# Patient Record
Sex: FEMALE | Race: WHITE | NOT HISPANIC OR LATINO | Employment: FULL TIME | URBAN - METROPOLITAN AREA
[De-identification: names, ages, dates, MRNs, and addresses within clinical notes are randomized per-mention and may not be internally consistent; named-entity substitution may affect disease eponyms.]

---

## 2017-04-05 ENCOUNTER — HOSPITAL ENCOUNTER (OUTPATIENT)
Dept: SLEEP CENTER | Facility: CLINIC | Age: 62
Discharge: HOME | End: 2017-04-05
Payer: COMMERCIAL

## 2017-04-05 ENCOUNTER — TRANSCRIBE ORDERS (OUTPATIENT)
Dept: SLEEP CENTER | Facility: CLINIC | Age: 62
End: 2017-04-05

## 2017-04-05 DIAGNOSIS — G47.33 OSA (OBSTRUCTIVE SLEEP APNEA): Primary | ICD-10-CM

## 2017-04-05 DIAGNOSIS — G47.33 OSA (OBSTRUCTIVE SLEEP APNEA): ICD-10-CM

## 2018-04-06 LAB — HCV AB SER-ACNC: NORMAL

## 2020-01-23 LAB
LEFT EYE DIABETIC RETINOPATHY: NORMAL
RIGHT EYE DIABETIC RETINOPATHY: NORMAL

## 2020-08-05 RX ORDER — FLUTICASONE PROPIONATE 50 MCG
2 SPRAY, SUSPENSION (ML) NASAL DAILY
COMMUNITY
Start: 2012-02-06 | End: 2021-03-29 | Stop reason: SDUPTHER

## 2020-08-05 RX ORDER — CITALOPRAM 20 MG/1
1 TABLET ORAL DAILY
COMMUNITY
Start: 2012-11-30 | End: 2021-03-29 | Stop reason: SDUPTHER

## 2020-08-05 RX ORDER — LISINOPRIL 20 MG/1
1 TABLET ORAL DAILY
COMMUNITY
Start: 2011-12-20 | End: 2020-08-10 | Stop reason: SDUPTHER

## 2020-08-05 RX ORDER — ALPRAZOLAM 0.5 MG/1
TABLET ORAL
COMMUNITY
Start: 2012-11-30 | End: 2022-04-29

## 2020-08-05 RX ORDER — ALBUTEROL SULFATE 90 UG/1
2 AEROSOL, METERED RESPIRATORY (INHALATION) EVERY 4 HOURS PRN
COMMUNITY
Start: 2011-11-17

## 2020-08-05 RX ORDER — DULAGLUTIDE 1.5 MG/.5ML
1.5 INJECTION, SOLUTION SUBCUTANEOUS WEEKLY
COMMUNITY
Start: 2020-05-26 | End: 2021-10-04

## 2020-08-05 RX ORDER — CHOLECALCIFEROL (VITAMIN D3) 125 MCG
1 CAPSULE ORAL DAILY
COMMUNITY
Start: 2012-11-30

## 2020-08-10 ENCOUNTER — OFFICE VISIT (OUTPATIENT)
Dept: INTERNAL MEDICINE CLINIC | Facility: CLINIC | Age: 65
End: 2020-08-10
Payer: COMMERCIAL

## 2020-08-10 VITALS
DIASTOLIC BLOOD PRESSURE: 74 MMHG | SYSTOLIC BLOOD PRESSURE: 126 MMHG | HEART RATE: 90 BPM | OXYGEN SATURATION: 97 % | HEIGHT: 67 IN | WEIGHT: 221 LBS | BODY MASS INDEX: 34.69 KG/M2 | TEMPERATURE: 97.6 F

## 2020-08-10 DIAGNOSIS — I73.00 RAYNAUD'S DISEASE WITHOUT GANGRENE: ICD-10-CM

## 2020-08-10 DIAGNOSIS — E08.65 DIABETES MELLITUS DUE TO UNDERLYING CONDITION, UNCONTROLLED, WITH HYPERGLYCEMIA (HCC): Primary | ICD-10-CM

## 2020-08-10 DIAGNOSIS — E66.9 OBESITY (BMI 30.0-34.9): ICD-10-CM

## 2020-08-10 DIAGNOSIS — I10 HYPERTENSION, ESSENTIAL, BENIGN: ICD-10-CM

## 2020-08-10 PROBLEM — E04.2 MULTIPLE THYROID NODULES: Status: ACTIVE | Noted: 2020-08-10

## 2020-08-10 PROBLEM — E66.811 OBESITY (BMI 30.0-34.9): Status: ACTIVE | Noted: 2020-08-10

## 2020-08-10 PROBLEM — G47.33 OSA (OBSTRUCTIVE SLEEP APNEA): Status: ACTIVE | Noted: 2020-08-10

## 2020-08-10 PROCEDURE — 3725F SCREEN DEPRESSION PERFORMED: CPT | Performed by: INTERNAL MEDICINE

## 2020-08-10 PROCEDURE — 3008F BODY MASS INDEX DOCD: CPT | Performed by: INTERNAL MEDICINE

## 2020-08-10 PROCEDURE — 1036F TOBACCO NON-USER: CPT | Performed by: INTERNAL MEDICINE

## 2020-08-10 PROCEDURE — 3078F DIAST BP <80 MM HG: CPT | Performed by: INTERNAL MEDICINE

## 2020-08-10 PROCEDURE — 99204 OFFICE O/P NEW MOD 45 MIN: CPT | Performed by: INTERNAL MEDICINE

## 2020-08-10 PROCEDURE — 3074F SYST BP LT 130 MM HG: CPT | Performed by: INTERNAL MEDICINE

## 2020-08-10 RX ORDER — LISINOPRIL 10 MG/1
1 TABLET ORAL DAILY
COMMUNITY
Start: 2020-08-01 | End: 2021-05-11 | Stop reason: SDUPTHER

## 2020-08-10 RX ORDER — BLOOD SUGAR DIAGNOSTIC
STRIP MISCELLANEOUS
COMMUNITY
Start: 2020-07-23

## 2020-08-10 NOTE — PROGRESS NOTES
Assessment/Plan:    Diabetes mellitus due to underlying condition, uncontrolled, with hyperglycemia ([address] )  Continue metformin and Trulicity  Endocrine following  On lisinopril  Up to date with DM eye exam    Lab Results   Component Value Date    HGBA1C 7 0 (H) 07/30/2020       Hypertension, essential, benign  Controlled on lisinopril    Raynaud's disease without gangrene  Discussed use of CCB for treatment  Will not start at this time    Obesity (BMI 30 0-34  9)  BMI Counseling: Body mass index is 34 61 kg/m²  The BMI is above normal  Nutrition recommendations include decreasing overall calorie intake  Problem List Items Addressed This Visit        Endocrine    Diabetes mellitus due to underlying condition, uncontrolled, with hyperglycemia ([address] ) - Primary     Continue metformin and Trulicity  Endocrine following  On lisinopril  Up to date with DM eye exam    Lab Results   Component Value Date    HGBA1C 7 0 (H) 07/30/2020            Relevant Medications    metFORMIN (GLUCOPHAGE) 500 mg tablet    Dulaglutide (Trulicity) 1 5 WN/9 3UN SOPN    Other Relevant Orders    CBC and differential       Cardiovascular and Mediastinum    Hypertension, essential, benign     Controlled on lisinopril         Relevant Medications    lisinopril (ZESTRIL) 10 mg tablet    Other Relevant Orders    CBC and differential    Raynaud's disease without gangrene     Discussed use of CCB for treatment  Will not start at this time            Other    Obesity (BMI 30 0-34  9)     BMI Counseling: Body mass index is 34 61 kg/m²  The BMI is above normal  Nutrition recommendations include decreasing overall calorie intake  Subjective:      Patient ID: Piedad Vergara is a 60 y o  female  HPI  Here to establish care    She was a patient of Dr Ritu Cotton  DM without complications on metformin and Trulicity followed by endocrinology  -140s  A1C at 7 from 8 1 since she lost weight with dietary changes  She lost 30lbs but has gained 10lbs back recently  She was seeing a doctor for a while for this but cannot afford cost of visit  She did not take any meds/supplements  Up to date with eye exam  She is on lisinopril  BP (+white coat HTN)    Cholesterol is normal  She is not on statin therapy  Sees a chiropractor for low back pain with left sciatica  Tightness in the left foot and ankle   Varicose veins and extensive spider veins in the feet  Redness on the calves for which she has seen dermatology and uses a topical steroid for 2 weeks at a time  + Raynauds in hands especially, +family history, no treatment  Up to date with screening tests    The following portions of the patient's history were reviewed and updated as appropriate: allergies, current medications, past family history, past medical history, past social history, past surgical history and problem list     Review of Systems   Constitutional: Positive for unexpected weight change (weight gain)  Negative for chills, fatigue and fever  HENT: Negative for congestion, sinus pressure and sore throat  Respiratory: Negative for cough, shortness of breath and wheezing  Cardiovascular: Positive for leg swelling  Negative for chest pain and palpitations  Gastrointestinal: Negative for abdominal pain, constipation, diarrhea, nausea and vomiting  Genitourinary: Negative for difficulty urinating  Musculoskeletal: Positive for arthralgias and back pain  Negative for myalgias  Skin: Positive for color change  Neurological: Negative for dizziness and headaches  Psychiatric/Behavioral: Negative for dysphoric mood  The patient is not nervous/anxious  Objective:      /74   Pulse 90   Temp 97 6 °F (36 4 °C)   Ht 5' 7" (1 702 m)   Wt 100 kg (221 lb)   SpO2 97%   BMI 34 61 kg/m²          Physical Exam  Constitutional:       Appearance: She is well-developed  HENT:      Head: Normocephalic and atraumatic        Right Ear: External ear normal       Left Ear: External ear normal    Eyes:      Conjunctiva/sclera: Conjunctivae normal    Neck:      Musculoskeletal: Neck supple  Cardiovascular:      Rate and Rhythm: Normal rate and regular rhythm  Pulses: no weak pulses          Dorsalis pedis pulses are 2+ on the right side and 2+ on the left side  Heart sounds: Normal heart sounds  No murmur  Comments: Extensive spider veins both feet  Pulmonary:      Effort: Pulmonary effort is normal  No respiratory distress  Breath sounds: Normal breath sounds  No wheezing or rales  Abdominal:      General: There is no distension  Palpations: Abdomen is soft  There is no mass  Tenderness: There is no abdominal tenderness  There is no guarding or rebound  Musculoskeletal:      Right lower leg: No edema  Left lower leg: No edema  Feet:      Right foot:      Skin integrity: Dry skin present  No ulcer, erythema or callus  Left foot:      Skin integrity: Dry skin present  No ulcer, erythema or callus  Skin:     General: Skin is warm and dry  Findings: Erythema (both calves) present  Neurological:      Mental Status: She is alert and oriented to person, place, and time  Psychiatric:         Behavior: Behavior normal          Thought Content: Thought content normal          Judgment: Judgment normal        Patient's shoes and socks removed  Right Foot/Ankle   Right Foot Inspection  Skin Exam: skin intact and dry skin no callus, no erythema, no pre-ulcer, no ulcer and no callus                          Toe Exam: ROM and strength within normal limits  Sensory       Monofilament testing: intact  Vascular    The right DP pulse is 2+  Left Foot/Ankle  Left Foot Inspection  Skin Exam: skin intact and dry skinno erythema, no pre-ulcer, no ulcer and no callus                         Toe Exam: ROM and strength within normal limits                   Sensory       Monofilament: intact  Vascular    The left DP pulse is 2+     Assign Risk Category:  No deformity present; No loss of protective sensation;  No weak pulses       Risk: 0

## 2020-08-11 ENCOUNTER — TELEPHONE (OUTPATIENT)
Dept: ADMINISTRATIVE | Facility: OTHER | Age: 65
End: 2020-08-11

## 2020-08-11 NOTE — LETTER
Procedure Request Form: Mammogram      Date Requested: 20  Patient: Margurite Shabnam  Patient : 1955   Referring Provider: Socrates Thornton, MD        Date of Procedure ______________________________       The above patient has informed us that they have completed their   most recent Mammogram at your facility  Please complete   this form and attach all corresponding procedure reports/results  Comments __________________________________________________________  ____________________________________________________________________  ____________________________________________________________________  ____________________________________________________________________    Facility Completing Procedure _________________________________________    Form Completed By (print name) _______________________________________      Signature __________________________________________________________      These reports are needed for  compliance    Please fax this completed form and a copy of the procedure report to our office located at [place] [address] as soon as possible to [phone] attention Kyara Moseley: Phone [phone]    We thank you for your assistance in treating our mutual patient

## 2020-08-11 NOTE — LETTER
Diabetic Eye Exam Form    Date Requested: 20  Patient: Stephanie Gonzales  Patient : 1955   Referring Provider: Cindy Franklin MD    Dilated Retinal Exam, Optomap-Iris Exam, or Fundus Photography Done         Yes ([place] one above)         No     Date of Diabetic Eye Exam ______________________________  Left Eye      Exam did show retinopathy    Exam did not show retinopathy         Mild       Moderate       None       Proliferative       Severe     Right Eye     Exam did show retinopathy    Exam did not show retinopathy         Mild       Moderate       None       Proliferative       Severe     Comments __________________________________________________________    Practice Providing Exam ______________________________________________    Exam Performed By (print name) _______________________________________      Provider Signature ___________________________________________________      These reports are needed for  compliance    Please fax this completed form and a copy of the Diabetic Eye Exam report to our office located at [place] [address] as soon as possible to [phone] pietro Martinez: Phone [phone]    We thank you for your assistance in treating our mutual patient

## 2020-08-11 NOTE — TELEPHONE ENCOUNTER
----- Message from Johnathon Smith MD sent at 8/10/2020  1:41 PM EDT -----  Please obtain:  [facility]  [facility]  Mammogram MRI of John

## 2020-08-11 NOTE — ASSESSMENT & PLAN NOTE
Continue metformin and Trulicity  Endocrine following  On lisinopril  Up to date with DM eye exam    Lab Results   Component Value Date    HGBA1C 7 0 (H) 07/30/2020

## 2020-08-11 NOTE — LETTER
Procedure Request Form: Mammogram      Date Requested: 20  Patient: Sondra Barnard  Patient : 1955   Referring Provider: Johnathon Smith, MD        Date of Procedure ______________________________       The above patient has informed us that they have completed their   most recent Mammogram at your facility  Please complete   this form and attach all corresponding procedure reports/results  Comments __________________________________________________________  ____________________________________________________________________  ____________________________________________________________________  ____________________________________________________________________    Facility Completing Procedure _________________________________________    Form Completed By (print name) _______________________________________      Signature __________________________________________________________      These reports are needed for  compliance    Please fax this completed form and a copy of the procedure report to our office located at [place] [address] as soon as possible to [phone] pietro Buckley: Phone [phone]    We thank you for your assistance in treating our mutual patient

## 2020-08-11 NOTE — ASSESSMENT & PLAN NOTE
BMI Counseling: Body mass index is 34 61 kg/m²  The BMI is above normal  Nutrition recommendations include decreasing overall calorie intake

## 2020-08-11 NOTE — LETTER
Diabetic Eye Exam Form    Date Requested: 20  Patient: Parish Second  Patient : 1955   Referring Provider: Nadeen Cruz MD    Dilated Retinal Exam, Optomap-Iris Exam, or Fundus Photography Done         Yes ([place] one above)         No     Date of Diabetic Eye Exam ______________________________  Left Eye      Exam did show retinopathy    Exam did not show retinopathy         Mild       Moderate       None       Proliferative       Severe     Right Eye     Exam did show retinopathy    Exam did not show retinopathy         Mild       Moderate       None       Proliferative       Severe     Comments __________________________________________________________    Practice Providing Exam ______________________________________________    Exam Performed By (print name) _______________________________________      Provider Signature ___________________________________________________      These reports are needed for  compliance    Please fax this completed form and a copy of the Diabetic Eye Exam report to our office located at [place] [address] as soon as possible to [phone] attention Karyna Perez: Phone [phone]    We thank you for your assistance in treating our mutual patient

## 2020-08-21 NOTE — TELEPHONE ENCOUNTER
Upon review of the In Basket request and the patient's chart, initial outreach has been made via fax, please see Contacts section for details  A second outreach attempt will be made within 5 business days  Eye request    Thank you  Stone Murcia MA

## 2020-08-21 NOTE — TELEPHONE ENCOUNTER
Upon review of the In Basket request we were able to locate, review, and update the patient chart as requested for Hepatitis C   Any additional questions or concerns should be emailed to the Practice Liaisons via [email or web address] email, please do not reply via In Basket      Thank you  Abel Francis MA

## 2020-08-21 NOTE — TELEPHONE ENCOUNTER
Upon review of the In Basket request and the patient's chart, initial outreach has been made via fax, please see Contacts section for details  A second outreach attempt will be made within 5 business days  Mammogram    Thank you  Abel Francis MA

## 2020-08-28 NOTE — TELEPHONE ENCOUNTER
Upon review of the In Basket request we were able to locate, review, and update the patient chart as requested for Diabetic Eye Exam     Any additional questions or concerns should be emailed to the Practice Liaisons via [email or web address] email, please do not reply via In Basket      Thank you  Ivanna Adrian MA

## 2020-09-09 NOTE — TELEPHONE ENCOUNTER
As a final attempt, a third outreach has been made via telephone call  Please see Contacts section for details  This encounter will be closed and completed by end of day  Should we receive the requested information because of previous outreach attempts, the requested patient's chart will be updated appropriately  Called NATALIA green will fax last report system is down at this time      Thank you  Janna Arriola MA

## 2020-09-09 NOTE — TELEPHONE ENCOUNTER
As a follow-up, a second attempt has been made for outreach via fax, please see Contacts section for details  A third and final attempt will be made within 5 business days  Faxed eye request  Thank you  Celester Comment, MA

## 2021-03-29 ENCOUNTER — OFFICE VISIT (OUTPATIENT)
Dept: INTERNAL MEDICINE CLINIC | Facility: CLINIC | Age: 66
End: 2021-03-29
Payer: COMMERCIAL

## 2021-03-29 VITALS
DIASTOLIC BLOOD PRESSURE: 72 MMHG | HEIGHT: 67 IN | BODY MASS INDEX: 35.69 KG/M2 | OXYGEN SATURATION: 95 % | WEIGHT: 227.4 LBS | SYSTOLIC BLOOD PRESSURE: 114 MMHG | HEART RATE: 101 BPM

## 2021-03-29 DIAGNOSIS — I10 HYPERTENSION, ESSENTIAL, BENIGN: ICD-10-CM

## 2021-03-29 DIAGNOSIS — H60.63 CHRONIC OTITIS EXTERNA OF BOTH EARS, UNSPECIFIED TYPE: Primary | ICD-10-CM

## 2021-03-29 DIAGNOSIS — E66.9 OBESITY (BMI 30.0-34.9): ICD-10-CM

## 2021-03-29 DIAGNOSIS — G47.33 OSA (OBSTRUCTIVE SLEEP APNEA): ICD-10-CM

## 2021-03-29 DIAGNOSIS — F32.5 MAJOR DEPRESSIVE DISORDER WITH SINGLE EPISODE, IN FULL REMISSION (HCC): ICD-10-CM

## 2021-03-29 DIAGNOSIS — E08.65 DIABETES MELLITUS DUE TO UNDERLYING CONDITION, UNCONTROLLED, WITH HYPERGLYCEMIA (HCC): ICD-10-CM

## 2021-03-29 DIAGNOSIS — J30.89 NON-SEASONAL ALLERGIC RHINITIS, UNSPECIFIED TRIGGER: ICD-10-CM

## 2021-03-29 DIAGNOSIS — E04.2 MULTIPLE THYROID NODULES: ICD-10-CM

## 2021-03-29 DIAGNOSIS — Z11.4 SCREENING FOR HIV (HUMAN IMMUNODEFICIENCY VIRUS): ICD-10-CM

## 2021-03-29 PROCEDURE — 3078F DIAST BP <80 MM HG: CPT | Performed by: INTERNAL MEDICINE

## 2021-03-29 PROCEDURE — 1036F TOBACCO NON-USER: CPT | Performed by: INTERNAL MEDICINE

## 2021-03-29 PROCEDURE — 3074F SYST BP LT 130 MM HG: CPT | Performed by: INTERNAL MEDICINE

## 2021-03-29 PROCEDURE — 3008F BODY MASS INDEX DOCD: CPT | Performed by: INTERNAL MEDICINE

## 2021-03-29 PROCEDURE — 99214 OFFICE O/P EST MOD 30 MIN: CPT | Performed by: INTERNAL MEDICINE

## 2021-03-29 RX ORDER — CITALOPRAM 20 MG/1
20 TABLET ORAL DAILY
Qty: 90 TABLET | Refills: 2 | Status: SHIPPED | OUTPATIENT
Start: 2021-03-29 | End: 2021-11-16

## 2021-03-29 RX ORDER — FLUTICASONE PROPIONATE 50 MCG
2 SPRAY, SUSPENSION (ML) NASAL DAILY
Qty: 3 BOTTLE | Refills: 2 | Status: SHIPPED | OUTPATIENT
Start: 2021-03-29

## 2021-03-29 NOTE — ASSESSMENT & PLAN NOTE
ON metformin and Trulicity  She is on an ACE inh not on a statin  Diabetic eye exam next week and was asked to have them send copy to the office  Lab Results   Component Value Date    HGBA1C 7 1 (H) 02/19/2021

## 2021-03-29 NOTE — PROGRESS NOTES
Assessment/Plan:    Diabetes mellitus due to underlying condition, uncontrolled, with hyperglycemia ([address] )  ON metformin and Trulicity  She is on an ACE inh not on a statin  Diabetic eye exam next week and was asked to have them send copy to the office  Lab Results   Component Value Date    HGBA1C 7 1 (H) 02/19/2021       Multiple thyroid nodules  US in 2019-stable nodules  Previous bx normal    BLANCA (obstructive sleep apnea)  Compliant with CPAP    Hypertension, essential, benign  Well controlled    Major depressive disorder with single episode, in full remission ([address] )  Stable and prefers to stay on Celexa         Problem List Items Addressed This Visit        Endocrine    Diabetes mellitus due to underlying condition, uncontrolled, with hyperglycemia ([address] )     ON metformin and Trulicity  She is on an ACE inh not on a statin  Diabetic eye exam next week and was asked to have them send copy to the office  Lab Results   Component Value Date    HGBA1C 7 1 (H) 02/19/2021            Multiple thyroid nodules     US in 2019-stable nodules  Previous bx normal            Respiratory    BLANCA (obstructive sleep apnea)     Compliant with CPAP         Non-seasonal allergic rhinitis    Relevant Medications    fluticasone (Flonase) 50 mcg/act nasal spray       Cardiovascular and Mediastinum    Hypertension, essential, benign     Well controlled            Other    Obesity (BMI 30 0-34  9)    Major depressive disorder with single episode, in full remission ([address] )     Stable and prefers to stay on Celexa         Relevant Medications    citalopram (CeleXA) 20 mg tablet      Other Visit Diagnoses     Chronic otitis externa of both ears, unspecified type    -  Primary    Relevant Medications    neomycin-polymyxin-hydrocortisone (CORTISPORIN) otic solution    Screening for HIV (human immunodeficiency virus)        Relevant Orders    HIV 1/2 Antigen/Antibody (4th Generation) w Reflex [facility]            Subjective:      Patient ID: BODØ Rufus Viramontes is a 60 y o  female  HPI  Here for a follow up  DM managed by endocrinology and most recent A1C in Feb was 7 1  She is metformin and Trulicity  C/o both ears bothering her for months, hurt and itchy without ringing hearing loss drainage  + Post nasal drip  COVID  In mid Dec after  was exposed  No symptoms  She is contemplating on getting the vaccine    The following portions of the patient's history were reviewed and updated as appropriate: allergies, current medications, past family history, past medical history, past social history, past surgical history and problem list     Review of Systems   Constitutional: Negative for chills, fatigue, fever and unexpected weight change  HENT: Positive for ear pain and postnasal drip  Negative for congestion, hearing loss, rhinorrhea, sinus pressure, sinus pain and sore throat  Respiratory: Negative for cough and shortness of breath  Cardiovascular: Negative for chest pain, palpitations and leg swelling  Gastrointestinal: Negative for abdominal pain, constipation, diarrhea, nausea and vomiting  Genitourinary: Negative for difficulty urinating  Musculoskeletal: Positive for arthralgias  Negative for myalgias  Neurological: Negative for dizziness and headaches  Objective:      /72   Pulse 101   Ht 5' 7" (1 702 m)   Wt 103 kg (227 lb 6 4 oz)   SpO2 95%   BMI 35 62 kg/m²          Physical Exam  Constitutional:       General: She is not in acute distress  Appearance: She is well-developed  She is obese  She is not ill-appearing, toxic-appearing or diaphoretic  HENT:      Head: Normocephalic and atraumatic  Right Ear: Tympanic membrane normal  There is no impacted cerumen  Left Ear: Tympanic membrane normal  There is no impacted cerumen  Ears:      Comments: Mild erythema in canals AU right >left  Eyes:      Conjunctiva/sclera: Conjunctivae normal    Neck:      Musculoskeletal: Neck supple        Comments: +thyroid nodules bilaterally  Cardiovascular:      Rate and Rhythm: Normal rate and regular rhythm  Heart sounds: Normal heart sounds  No murmur  Pulmonary:      Effort: Pulmonary effort is normal  No respiratory distress  Breath sounds: Normal breath sounds  No wheezing or rales  Abdominal:      General: There is no distension  Palpations: Abdomen is soft  There is no mass  Tenderness: There is no abdominal tenderness  There is no guarding or rebound  Musculoskeletal:      Right lower leg: No edema  Left lower leg: No edema  Skin:     General: Skin is warm and dry  Neurological:      Mental Status: She is alert and oriented to person, place, and time  Psychiatric:         Mood and Affect: Mood normal          Behavior: Behavior normal          Thought Content:  Thought content normal          Judgment: Judgment normal

## 2021-03-30 ENCOUNTER — TELEPHONE (OUTPATIENT)
Dept: ADMINISTRATIVE | Facility: OTHER | Age: 66
End: 2021-03-30

## 2021-03-30 NOTE — TELEPHONE ENCOUNTER
----- Message from Susan Cherry sent at 3/29/2021 10:17 AM EDT -----  Regarding: Colonoscopy  Patient had a colonoscopy done with [facility] 4 years ago and [facility] does not having anything on file  Can you help us obtain this?

## 2021-03-30 NOTE — TELEPHONE ENCOUNTER
Upon review of the In Basket request and the patient's chart, initial outreach has been made via fax, please see Contacts section for details       Thank you  Calin Hernandez

## 2021-03-30 NOTE — LETTER
Procedure Request Form: Colonoscopy      Date Requested: 21  Patient: Ginger Reji  Patient : [birth year]   Referring Provider: Efren Branham, MD        Date of Procedure ______________________________       The above patient has informed us that they have completed their   most recent Colonoscopy at your facility  Please complete   this form and attach all corresponding procedure reports/results  Comments __________________________________________________________  ____________________________________________________________________  ____________________________________________________________________  ____________________________________________________________________    Facility Completing Procedure _________________________________________    Form Completed By (print name) _______________________________________      Signature __________________________________________________________      These reports are needed for  compliance    Please fax this completed form and a copy of the procedure report to our office located at [place] [address] as soon as possible to [phone] [facility]: Phone [phone]    We thank you for your assistance in treating our mutual patient

## 2021-03-30 NOTE — LETTER
Procedure Request Form: Colonoscopy      Date Requested: 21  Patient: Dellia Field  Patient : [birth year]   Referring Provider: Cindy Franklin, MD        Date of Procedure ______________________________       The above patient has informed us that they have completed their   most recent Colonoscopy that you have on file  Please complete   this form and attach all corresponding procedure reports/results  Comments __________________________________________________________  ____________________________________________________________________  ____________________________________________________________________  ____________________________________________________________________    Facility Completing Procedure _________________________________________    Form Completed By (print name) _______________________________________      Signature __________________________________________________________      These reports are needed for  compliance    Please fax this completed form and a copy of the procedure report to our office located at [place] [address] as soon as possible to [phone] pietro Villeda: Phone [phone]    We thank you for your assistance in treating our mutual patient

## 2021-04-02 NOTE — TELEPHONE ENCOUNTER
As a follow-up, a second attempt has been made for outreach via fax, please see Contacts section for details      Thank you  Willard Reyez

## 2021-04-05 NOTE — TELEPHONE ENCOUNTER
As a final attempt, a third outreach has been made via telephone call  Please see Contacts section for details  This encounter will be closed and completed by end of day  Should we receive the requested information because of previous outreach attempts, the requested patient's chart will be updated appropriately       Thank you  Janey Garsia

## 2021-04-12 LAB
LEFT EYE DIABETIC RETINOPATHY: NORMAL
RIGHT EYE DIABETIC RETINOPATHY: NORMAL

## 2021-05-11 DIAGNOSIS — I10 HYPERTENSION, ESSENTIAL, BENIGN: Primary | ICD-10-CM

## 2021-05-11 RX ORDER — LISINOPRIL 10 MG/1
10 TABLET ORAL DAILY
Qty: 90 TABLET | Refills: 1 | Status: SHIPPED | OUTPATIENT
Start: 2021-05-11 | End: 2021-08-26 | Stop reason: SDUPTHER

## 2021-06-24 ENCOUNTER — OFFICE VISIT (OUTPATIENT)
Dept: SLEEP CENTER | Facility: CLINIC | Age: 66
End: 2021-06-24
Payer: COMMERCIAL

## 2021-06-24 VITALS
BODY MASS INDEX: 35 KG/M2 | WEIGHT: 223 LBS | SYSTOLIC BLOOD PRESSURE: 110 MMHG | DIASTOLIC BLOOD PRESSURE: 60 MMHG | HEIGHT: 67 IN

## 2021-06-24 DIAGNOSIS — G47.33 OSA (OBSTRUCTIVE SLEEP APNEA): Primary | ICD-10-CM

## 2021-06-24 PROCEDURE — 3074F SYST BP LT 130 MM HG: CPT | Performed by: PSYCHIATRY & NEUROLOGY

## 2021-06-24 PROCEDURE — 1036F TOBACCO NON-USER: CPT | Performed by: PSYCHIATRY & NEUROLOGY

## 2021-06-24 PROCEDURE — 3078F DIAST BP <80 MM HG: CPT | Performed by: PSYCHIATRY & NEUROLOGY

## 2021-06-24 PROCEDURE — 99204 OFFICE O/P NEW MOD 45 MIN: CPT | Performed by: PSYCHIATRY & NEUROLOGY

## 2021-06-24 PROCEDURE — 3008F BODY MASS INDEX DOCD: CPT | Performed by: PSYCHIATRY & NEUROLOGY

## 2021-06-24 NOTE — PATIENT INSTRUCTIONS
Recommendations:  1) APAP 10-15cm with a NP with hose coming out of the top of the head  2) Driving safety was reviewed with patient  If the patient feels too sleepy to drive she knows not to drive  If she becomes sleepy while driving she will pull over and nap  3) Follow-up in 6-8 weeks after getting a new machine  4) Call with any questions or concerns

## 2021-06-24 NOTE — LETTER
2021     Mary Mari MD  [address] [address]  [address] [place] [address]    Patient: Angelique Rice   YOB: 1955   Date of Visit: 2021       Dear Dr Tin Chacon: Thank you for referring Angelique Rice to me for evaluation  Below are my notes for this consultation  If you have questions, please do not hesitate to call me  I look forward to following your patient along with you  Sincerely,        Luis English MD        CC: No Recipients  Luis English MD  2021  9:05 AM  Sign when Signing Visit  Sleep Medicine Consultation Note    HPI:  Ms Angelique Rice is a 60 y o  female seen at the request of Mary Mari MD for advice regarding suspected sleep disordered breathing  The patient has BLANCA and was diagnosed in  by Dr Filemon Bowers  She was last seen by him in 2017 for a follow up visit  She has not been seen since  PSG : AHI 22, REM AHI 60, with an oxygen salome of 80%  Subsequent CPAP titration recommended CPAP 10cm  She presents today due to needing a new CPAP machine as the "motor on CPAP has  "  She uses [employer] for her supplies  She was unable to see Dr Filemon Bowers soon enough and she needed an appointment sooner       Please see below for continuation of the HPI:      Sleep Disordered Breathing:  -Snoring: yes, in the past  Now she is unsure   -Modifying factors: CPAP has helped   -Observed Apneas: no  -Mouth Breathing at night: no  -Dry Mouth in morning: no   -Nocturnal Gasping: no  -Nasal Obstruction: sometimes  -Weight: she has lost 15 lbs in the last 2 years    Sleep Pattern:  -Location: bedroom   -Bed/Recliner/Wedge: bed  -Bed Partner: yes  -HOB: noflat  -# of pillows under head: 1  -Position: side  -Bedtime: 11p-12a  -Lights out: same time  -Environmental: No lights/TV  -Latency: same time  -Awakenings: 2-3   -Reason: unsure   -Duration: mins  -Wake time: 8am   -Alarm: yes  -Rise time: same time  -Days off: 1-9am  -Shift Work: days  -Patient's estimate of total sleep time: 7-9h      Daytime Symptoms:  -Upon Awakening: fine  -Daytime fatigue/sleepiness: denied  -Naps: denied  -Involuntary Dozing: denied  -Cognitive Symptoms: denied  -Driving: Difficulty with sleepiness and driving:  denied   -- Close calls related to sleepiness: denied   -- Accidents related to sleepiness: denied      Questionnaires:   Sitting and reading: Would never doze  Watching TV: Would never doze  Sitting, inactive in a public place (e g  a theatre or a meeting): Would never doze  As a passenger in a car for an hour without a break: Would never doze  Lying down to rest in the afternoon when circumstances permit: Would never doze  Sitting and talking to someone: Would never doze  Sitting quietly after a lunch without alcohol: Would never doze  In a car, while stopped for a few minutes in traffic: Would never doze  Total score: 0      Sleep Review of Symptoms:  -Parasomnias:  --Sleep Walking: no  --Dream Enactment: no  --Bruxism: no  -Motor:  --RLS: yes, nightly  This doesn't keep her awake  --PLMS: no  -Narcolepsy:  --Hallucinations: no  --Paralysis: no  --Cataplexy: no    Childhood Sleep History: no    Prior Sleep Studies/Evaluations:  See HPI    Family History:  Family history of sleep disorders: no    Patient Active Problem List   Diagnosis    Diabetes mellitus due to underlying condition, uncontrolled, with hyperglycemia ([facility] )    Hypertension, essential, benign    Raynaud's disease without gangrene    Obesity (BMI 30 0-34  9)    BLANCA (obstructive sleep apnea)    Multiple thyroid nodules    Major depressive disorder with single episode, in full remission ([facility] )    Non-seasonal allergic rhinitis     Past Medical History:   Diagnosis Date    Complex endometrial hyperplasia     Diabetes mellitus ([facility] )     Hypertension     Pneumonia          --> Denies any cardiopulmonary disease  --> Seizure hx: denies  --> Head injury with LOC: denies  --> Supplemental Oxygen Use: denies    Labs Results for Fox Thomas (MRN [id number]) as of 6/24/2021 08:45   Ref  Range 2/19/2021 09:25   Hemoglobin A1C Latest Ref Range: <5 7 % 7 1 (H)   eAG, EST AVG Glucose Latest Ref Range: <154 mg/dL 157 (H)     Past Surgical History:   Procedure Laterality Date    HYSTERECTOMY  03/2017    Dr Kathie Branham W/ BILATERAL SALPINGOOPHORECTOMY         --> ENT procedures: denies    Current Outpatient Medications   Medication Sig Dispense Refill    Aspirin Buf,CaCarb-MgCarb-MgO, 81 MG TABS Take 1 tablet by mouth daily      citalopram (CeleXA) 20 mg tablet Take 1 tablet (20 mg total) by mouth daily 90 tablet 2    Dulaglutide (Trulicity) 1 5 CZ/8 9ML SOPN Inject 1 5 mg under the skin Once a week      fluticasone (Flonase) 50 mcg/act nasal spray 2 sprays into each nostril daily 3 Bottle 2    lisinopril (ZESTRIL) 10 mg tablet Take 1 tablet (10 mg total) by mouth daily 90 tablet 1    metFORMIN (GLUCOPHAGE) 500 mg tablet Take 1 tablet by mouth 2 (two) times a day      OneTouch Ultra test strip       vitamin B-12 (VITAMIN B-12) 500 mcg tablet Take 1 tablet by mouth daily      albuterol (ProAir HFA) 90 mcg/act inhaler Inhale 2 puffs every 4 (four) hours as needed (Patient not taking: Reported on 6/24/2021)      ALPRAZolam (XANAX) 0 5 mg tablet Take by mouth (Patient not taking: Reported on 6/24/2021)      Cholecalciferol 50 MCG (2000 UT) TABS Take by mouth daily (Patient not taking: Reported on 6/24/2021)      neomycin-polymyxin-hydrocortisone (CORTISPORIN) otic solution Administer 3 drops into both ears every 8 (eight) hours (Patient not taking: Reported on 6/24/2021) 10 mL 1     No current facility-administered medications for this visit  Social History:  -Employment: nail salon  -Smoking: no  -Caffeine: coffee twice a week   Has dark chocolate   -Alcohol: occasionally  -THC: no  -OTC/Supplements/herbals: no  -Illicits: denies  -Family: lives with family    ROS:  Genitourinary none Cardiology none   Gastrointestinal none   Neurology none   Constitutional none   Integumentary none   Psychiatry depression   Musculoskeletal back pain   Pulmonary none   ENT none   Endocrine excessive thirst   Hematological none       MSE:  -Alert and appropriate: alert, calm, cooperative  -Oriented to person, place and time:  name, age, location, day/date/mon/yr  -Behavior: good, sustained eye contact  -Speech: Unremarkable rate/rhythm/volume  -Mood: "fine"  -Affect: full range  -Thought Processes: linear, logical, goal directed  PE:  Body mass index is 34 93 kg/m²  Vitals:    21 0835   BP: 110/60   Weight: 101 kg (223 lb)   Height: 5' 7" (1 702 m)       -General:  In NAD    -Eyes: Conjunctival injection: none     -EOM:  PERRLA, EOMI   -Eyelid hooding: yes    -ENT: MP:    -Facial deformity: no retrognathia   -Hard palate:  arch   -Soft palate:  Crowding with low set palate   -Gums and teeth: normal dentition   -Tongue:  Scalloping   -Nares:  Patent    -Neck/Lymphatics: Lymphadenopathy:  none appreciated   -Masses:  none appreciated   -Circumference: Neck Circumference: 15 "    -Cardiac: Auscultation:  RRR   - LE edema over shins: none appreciated    -Pulm: -Respirations: unlaboured         -Auscultation:  CTA bilaterally, posterior fields    -Neuro: No resting tremor     -Musculoskeletal: Gait and stance: normal turning and ambulation; unremarkable  Assessment:  Ms Maryam Londono is a 60 y o  female who is seen to evaluate for treatment of obstructive sleep apnea  The patient has been doing well on her CPAP since getting it in , but she is now getting an error message that her motor has  and is in need of a new machine  The pathophysiology of, the reasons to treat and treatment options for obstructive sleep apnea were all reviewed with the patient today  She is amenable to continued treatment with PAP therapy with NP   Discussed keeping nasal passages clear, abstaining from alcohol, and other sedating drugs at night- which will worsen symptoms of BLANCA  --History provided by: patient   --Records reviewed: in chart and previous PSG and CPAP study      Recommendations:  1) APAP 10-15cm with a NP with hose coming out of the top of the head  2) Driving safety was reviewed with patient  If the patient feels too sleepy to drive she knows not to drive  If she becomes sleepy while driving she will pull over and nap  3) Follow-up in 6-8 weeks after getting a new machine  4) Call with any questions or concerns  All questions answered for the patient, who indicated understanding and agreed with the plan       gD Santos MD  Psychiatry/ Sleep medicine

## 2021-06-24 NOTE — PROGRESS NOTES
Sleep Medicine Consultation Note    HPI:  Ms Kenna Luis is a 60 y o  female seen at the request of Felicitas Perez MD for advice regarding suspected sleep disordered breathing  The patient has BLANCA and was diagnosed in  by Dr Jojo Byrne  She was last seen by him in 2017 for a follow up visit  She has not been seen since  PSG : AHI 22, REM AHI 60, with an oxygen salome of 80%  Subsequent CPAP titration recommended CPAP 10cm  She presents today due to needing a new CPAP machine as the "motor on CPAP has  "  She uses [employer] for her supplies  She was unable to see Dr Jojo Byrne soon enough and she needed an appointment sooner  Please see below for continuation of the HPI:      Sleep Disordered Breathing:  -Snoring: yes, in the past  Now she is unsure   -Modifying factors: CPAP has helped   -Observed Apneas: no  -Mouth Breathing at night: no  -Dry Mouth in morning: no   -Nocturnal Gasping: no  -Nasal Obstruction: sometimes  -Weight: she has lost 15 lbs in the last 2 years    Sleep Pattern:  -Location: bedroom   -Bed/Recliner/Wedge: bed  -Bed Partner: yes  -HOB: noflat  -# of pillows under head: 1  -Position: side  -Bedtime: 11p-12a  -Lights out: same time  -Environmental: No lights/TV  -Latency: same time  -Awakenings: 2-3   -Reason: unsure   -Duration: mins  -Wake time: 8am   -Alarm: yes  -Rise time: same time  -Days off: 1-9am  -Shift Work: days  -Patient's estimate of total sleep time: 7-9h      Daytime Symptoms:  -Upon Awakening: fine  -Daytime fatigue/sleepiness: denied  -Naps: denied  -Involuntary Dozing: denied  -Cognitive Symptoms: denied  -Driving: Difficulty with sleepiness and driving:  denied   -- Close calls related to sleepiness: denied   -- Accidents related to sleepiness: denied      Questionnaires:   Sitting and reading: Would never doze  Watching TV: Would never doze  Sitting, inactive in a public place (e g  a theatre or a meeting):  Would never doze  As a passenger in a car for an hour without a break: Would never doze  Lying down to rest in the afternoon when circumstances permit: Would never doze  Sitting and talking to someone: Would never doze  Sitting quietly after a lunch without alcohol: Would never doze  In a car, while stopped for a few minutes in traffic: Would never doze  Total score: 0      Sleep Review of Symptoms:  -Parasomnias:  --Sleep Walking: no  --Dream Enactment: no  --Bruxism: no  -Motor:  --RLS: yes, nightly  This doesn't keep her awake  --PLMS: no  -Narcolepsy:  --Hallucinations: no  --Paralysis: no  --Cataplexy: no    Childhood Sleep History: no    Prior Sleep Studies/Evaluations:  See HPI    Family History:  Family history of sleep disorders: no    Patient Active Problem List   Diagnosis    Diabetes mellitus due to underlying condition, uncontrolled, with hyperglycemia ([facility] )    Hypertension, essential, benign    Raynaud's disease without gangrene    Obesity (BMI 30 0-34  9)    BLANCA (obstructive sleep apnea)    Multiple thyroid nodules    Major depressive disorder with single episode, in full remission ([facility] )    Non-seasonal allergic rhinitis     Past Medical History:   Diagnosis Date    Complex endometrial hyperplasia     Diabetes mellitus ([facility] )     Hypertension     Pneumonia          --> Denies any cardiopulmonary disease  --> Seizure hx: denies  --> Head injury with LOC: denies  --> Supplemental Oxygen Use: denies    Labs   Results for Lindsay Calvo (MRN [id number]) as of 6/24/2021 08:45   Ref   Range 2/19/2021 09:25   Hemoglobin A1C Latest Ref Range: <5 7 % 7 1 (H)   eAG, EST AVG Glucose Latest Ref Range: <154 mg/dL 157 (H)     Past Surgical History:   Procedure Laterality Date    HYSTERECTOMY  03/2017    Dr Manuel Campbell W/ BILATERAL SALPINGOOPHORECTOMY         --> ENT procedures: denies    Current Outpatient Medications   Medication Sig Dispense Refill    Aspirin Buf,CaCarb-MgCarb-MgO, 81 MG TABS Take 1 tablet by mouth daily  citalopram (CeleXA) 20 mg tablet Take 1 tablet (20 mg total) by mouth daily 90 tablet 2    Dulaglutide (Trulicity) 1 5 CJ/3 9UF SOPN Inject 1 5 mg under the skin Once a week      fluticasone (Flonase) 50 mcg/act nasal spray 2 sprays into each nostril daily 3 Bottle 2    lisinopril (ZESTRIL) 10 mg tablet Take 1 tablet (10 mg total) by mouth daily 90 tablet 1    metFORMIN (GLUCOPHAGE) 500 mg tablet Take 1 tablet by mouth 2 (two) times a day      OneTouch Ultra test strip       vitamin B-12 (VITAMIN B-12) 500 mcg tablet Take 1 tablet by mouth daily      albuterol (ProAir HFA) 90 mcg/act inhaler Inhale 2 puffs every 4 (four) hours as needed (Patient not taking: Reported on 6/24/2021)      ALPRAZolam (XANAX) 0 5 mg tablet Take by mouth (Patient not taking: Reported on 6/24/2021)      Cholecalciferol 50 MCG (2000 UT) TABS Take by mouth daily (Patient not taking: Reported on 6/24/2021)      neomycin-polymyxin-hydrocortisone (CORTISPORIN) otic solution Administer 3 drops into both ears every 8 (eight) hours (Patient not taking: Reported on 6/24/2021) 10 mL 1     No current facility-administered medications for this visit  Social History:  -Employment: nail salon  -Smoking: no  -Caffeine: coffee twice a week   Has dark chocolate   -Alcohol: occasionally  -THC: no  -OTC/Supplements/herbals: no  -Illicits: denies  -Family: lives with family    ROS:  Genitourinary none   Cardiology none   Gastrointestinal none   Neurology none   Constitutional none   Integumentary none   Psychiatry depression   Musculoskeletal back pain   Pulmonary none   ENT none   Endocrine excessive thirst   Hematological none       MSE:  -Alert and appropriate: alert, calm, cooperative  -Oriented to person, place and time:  name, age, location, day/date/mon/yr  -Behavior: good, sustained eye contact  -Speech: Unremarkable rate/rhythm/volume  -Mood: "fine"  -Affect: full range  -Thought Processes: linear, logical, goal directed  PE:  Body mass index is 34 93 kg/m²  Vitals:    21 0835   BP: 110/60   Weight: 101 kg (223 lb)   Height: 5' 7" (1 702 m)       -General:  In NAD    -Eyes: Conjunctival injection: none     -EOM:  PERRLA, EOMI   -Eyelid hooding: yes    -ENT: MP:    -Facial deformity: no retrognathia   -Hard palate:  arch   -Soft palate:  Crowding with low set palate   -Gums and teeth: normal dentition   -Tongue:  Scalloping   -Nares:  Patent    -Neck/Lymphatics: Lymphadenopathy:  none appreciated   -Masses:  none appreciated   -Circumference: Neck Circumference: 15 "    -Cardiac: Auscultation:  RRR   - LE edema over shins: none appreciated    -Pulm: -Respirations: unlaboured         -Auscultation:  CTA bilaterally, posterior fields    -Neuro: No resting tremor     -Musculoskeletal: Gait and stance: normal turning and ambulation; unremarkable  Assessment:  Ms Kenna Luis is a 60 y o  female who is seen to evaluate for treatment of obstructive sleep apnea  The patient has been doing well on her CPAP since getting it in , but she is now getting an error message that her motor has  and is in need of a new machine  The pathophysiology of, the reasons to treat and treatment options for obstructive sleep apnea were all reviewed with the patient today  She is amenable to continued treatment with PAP therapy with NP  Discussed keeping nasal passages clear, abstaining from alcohol, and other sedating drugs at night- which will worsen symptoms of BLANCA  --History provided by: patient   --Records reviewed: in chart and previous PSG and CPAP study      Recommendations:  1) APAP 10-15cm with a NP with hose coming out of the top of the head  2) Driving safety was reviewed with patient  If the patient feels too sleepy to drive she knows not to drive  If she becomes sleepy while driving she will pull over and nap  3) Follow-up in 6-8 weeks after getting a new machine    4) Call with any questions or concerns  All questions answered for the patient, who indicated understanding and agreed with the plan       Ale Sears MD  Psychiatry/ Sleep medicine

## 2021-06-25 ENCOUNTER — TELEPHONE (OUTPATIENT)
Dept: SLEEP CENTER | Facility: CLINIC | Age: 66
End: 2021-06-25

## 2021-07-08 ENCOUNTER — TELEPHONE (OUTPATIENT)
Dept: SLEEP CENTER | Facility: CLINIC | Age: 66
End: 2021-07-08

## 2021-07-08 DIAGNOSIS — G47.33 OSA (OBSTRUCTIVE SLEEP APNEA): Primary | ICD-10-CM

## 2021-07-08 NOTE — TELEPHONE ENCOUNTER
Patient is switching to Medicare in August  Will need a new sleep study regardless  Wants to wait so she can be covered at 100%  If a home study is available great, but she's open to a Diagnostic too  It's been 10 years so she's deff due for one  Thanks

## 2021-08-26 DIAGNOSIS — I10 HYPERTENSION, ESSENTIAL, BENIGN: ICD-10-CM

## 2021-08-26 RX ORDER — LISINOPRIL 10 MG/1
10 TABLET ORAL DAILY
Qty: 14 TABLET | Refills: 0 | Status: SHIPPED | OUTPATIENT
Start: 2021-08-26 | End: 2021-09-10 | Stop reason: SDUPTHER

## 2021-09-09 ENCOUNTER — OFFICE VISIT (OUTPATIENT)
Dept: SLEEP CENTER | Facility: CLINIC | Age: 66
End: 2021-09-09
Payer: COMMERCIAL

## 2021-09-09 VITALS
DIASTOLIC BLOOD PRESSURE: 70 MMHG | SYSTOLIC BLOOD PRESSURE: 114 MMHG | HEIGHT: 67 IN | BODY MASS INDEX: 35 KG/M2 | HEART RATE: 83 BPM | WEIGHT: 223 LBS

## 2021-09-09 DIAGNOSIS — G47.33 OSA (OBSTRUCTIVE SLEEP APNEA): Primary | ICD-10-CM

## 2021-09-09 PROCEDURE — 99213 OFFICE O/P EST LOW 20 MIN: CPT | Performed by: PSYCHIATRY & NEUROLOGY

## 2021-09-09 PROCEDURE — 1036F TOBACCO NON-USER: CPT | Performed by: PSYCHIATRY & NEUROLOGY

## 2021-09-09 PROCEDURE — 3008F BODY MASS INDEX DOCD: CPT | Performed by: PSYCHIATRY & NEUROLOGY

## 2021-09-09 NOTE — PATIENT INSTRUCTIONS
Recommendations:    1) HST not that she has new insurance  She will also speak to DME about a new machine  2) Safe driving reviewed  3) Follow-up 6-8 weeks after set up of new machine  4) Call with any questions or concerns

## 2021-09-09 NOTE — PROGRESS NOTES
Sleep Medicine Follow-Up Note    HPI: 73yo F with BLANCA being seen for a follow up  Treatment Summary: The patient has BLANCA and was diagnosed in 2012 by Dr Mary Torrez  She was last seen by him in 2017 for a follow up visit  She has not been seen since  PSG : AHI 22, REM AHI 60, with an oxygen salome of 80%  Subsequent CPAP titration recommended CPAP 10cm  She presents today due to needing a new CPAP machine as the "motor on CPAP has  "  She uses [employer] for her supplies  She was unable to see Dr Mary Torrez soon enough and she needed an appointment sooner  HPI:   Today, patient presents unaccompanied  She has not yet picked up her machine and is looking to get it set up today  Her machine is still not working and she is due for a new one  She is unsure why she is here today  Respiratory:  -Ongoing Snoring: yes  -Mouth Breathing: no  -Dry Mouth: no  -Nocturnal Gasping: no    ROS:   Genitourinary none   Cardiology none   Gastrointestinal none   Neurology none   Constitutional none   Integumentary none   Psychiatry depression   Musculoskeletal joint pain and back pain   Pulmonary none   ENT none   Endocrine none   Hematological none         Sleep Pattern:  -Bedtime: 11pm  -Lights Out: same time  -Environment: no Lights/TV  -Latency: 5 mins  -Awakenings: none  -Wake Time: 7am  -Rise Time: same time  -Patient's estimate of Sleep Time: 7-9h      Daytime Symptoms:  -Upon Awakening: fine  -Daytime fatigue/sleepiness: denied  -Naps: denied  -Involuntary Dozing: denied  -Cognitive Symptoms: denied  -Driving: Difficulty with sleepiness and driving:  denied   -- Close calls related to sleepiness: denied   -- Accidents related to sleepiness: denied    Substance Use:  -Caffeine: 4-5 pieces of dark chocolate a day  -Alcohol: no  -THC: no    --> Denies any significant medical changes since last visit     --> Supplemental Oxygen Use: denies    Questionnaire:  Sitting and reading: Slight chance of dozing  Watching TV: Would never doze  Sitting, inactive in a public place (e g  a theatre or a meeting): Would never doze  As a passenger in a car for an hour without a break: Would never doze  Lying down to rest in the afternoon when circumstances permit: Would never doze  Sitting and talking to someone: Would never doze  Sitting quietly after a lunch without alcohol: Would never doze  In a car, while stopped for a few minutes in traffic: Would never doze  Total score: 1      PE:    /70   Pulse 83   Ht 5' 7" (1 702 m)   Wt 101 kg (223 lb)   BMI 34 93 kg/m²     General:  In NAD  Pul: Respirations: unlaboured  MS: No atrophy  Neuro: No resting tremor  Gait normal turning & station; unremarkable overall  Psych: Socially appropriate  Pleasant  No overt dysphoria  Assessment: The patient has not yet gotten her sleep study or her new CPAP  She continues to snore at night and wishes to treat her BLANCA  Recommendations:    1) HST not that she has new insurance  She will also speak to DME about a new machine  2) Safe driving reviewed  3) Follow-up 6-8 weeks after set up of new machine  4) Call with any questions or concerns  All questions answered for the patient, who indicated understanding and agreed with the plan       Nikos Roach MD  Psychiatry/ Sleep medicine

## 2021-09-10 DIAGNOSIS — I10 HYPERTENSION, ESSENTIAL, BENIGN: ICD-10-CM

## 2021-09-10 RX ORDER — LISINOPRIL 10 MG/1
10 TABLET ORAL DAILY
Qty: 14 TABLET | Refills: 0 | Status: SHIPPED | OUTPATIENT
Start: 2021-09-10 | End: 2021-12-27 | Stop reason: SDUPTHER

## 2021-09-10 RX ORDER — LISINOPRIL 10 MG/1
10 TABLET ORAL DAILY
Qty: 90 TABLET | Refills: 1 | Status: SHIPPED | OUTPATIENT
Start: 2021-09-10 | End: 2021-10-04

## 2021-09-10 NOTE — TELEPHONE ENCOUNTER
Patient needs a short supply to local pharmacy until mail order is received  Patient is leaving for vacation tomorrow

## 2021-09-10 NOTE — TELEPHONE ENCOUNTER
Requested medication(s) are due for refill today: Yes  Patient has already received a courtesy refill: No    I sent a refill to her local pharmacy but she also wants 90 days to mail order

## 2021-10-04 ENCOUNTER — OFFICE VISIT (OUTPATIENT)
Dept: INTERNAL MEDICINE CLINIC | Facility: CLINIC | Age: 66
End: 2021-10-04
Payer: COMMERCIAL

## 2021-10-04 ENCOUNTER — HOSPITAL ENCOUNTER (OUTPATIENT)
Dept: SLEEP CENTER | Facility: CLINIC | Age: 66
Discharge: HOME | End: 2021-10-04
Payer: COMMERCIAL

## 2021-10-04 VITALS
SYSTOLIC BLOOD PRESSURE: 124 MMHG | OXYGEN SATURATION: 96 % | WEIGHT: 293 LBS | HEIGHT: 67 IN | BODY MASS INDEX: 45.99 KG/M2 | DIASTOLIC BLOOD PRESSURE: 70 MMHG | HEART RATE: 82 BPM

## 2021-10-04 DIAGNOSIS — Z13.6 SCREENING FOR CARDIOVASCULAR CONDITION: ICD-10-CM

## 2021-10-04 DIAGNOSIS — E08.65 DIABETES MELLITUS DUE TO UNDERLYING CONDITION, UNCONTROLLED, WITH HYPERGLYCEMIA (HCC): ICD-10-CM

## 2021-10-04 DIAGNOSIS — G47.33 OSA (OBSTRUCTIVE SLEEP APNEA): ICD-10-CM

## 2021-10-04 DIAGNOSIS — Z13.820 SCREENING FOR OSTEOPOROSIS: ICD-10-CM

## 2021-10-04 DIAGNOSIS — N95.9 UNSPECIFIED MENOPAUSAL AND PERIMENOPAUSAL DISORDER: ICD-10-CM

## 2021-10-04 DIAGNOSIS — E66.01 CLASS 2 SEVERE OBESITY WITH SERIOUS COMORBIDITY AND BODY MASS INDEX (BMI) OF 36.0 TO 36.9 IN ADULT, UNSPECIFIED OBESITY TYPE (HCC): ICD-10-CM

## 2021-10-04 DIAGNOSIS — Z23 NEED FOR VACCINATION: ICD-10-CM

## 2021-10-04 DIAGNOSIS — Z00.00 MEDICARE WELCOME EXAM: Primary | ICD-10-CM

## 2021-10-04 DIAGNOSIS — E04.2 MULTIPLE THYROID NODULES: ICD-10-CM

## 2021-10-04 PROBLEM — E66.812 CLASS 2 SEVERE OBESITY WITH SERIOUS COMORBIDITY AND BODY MASS INDEX (BMI) OF 36.0 TO 36.9 IN ADULT, UNSPECIFIED OBESITY TYPE (HCC): Status: ACTIVE | Noted: 2021-10-04

## 2021-10-04 PROCEDURE — G0399 HOME SLEEP TEST/TYPE 3 PORTA: HCPCS | Performed by: PSYCHIATRY & NEUROLOGY

## 2021-10-04 PROCEDURE — G0399 HOME SLEEP TEST/TYPE 3 PORTA: HCPCS

## 2021-10-04 PROCEDURE — G0009 ADMIN PNEUMOCOCCAL VACCINE: HCPCS | Performed by: INTERNAL MEDICINE

## 2021-10-04 PROCEDURE — 4040F PNEUMOC VAC/ADMIN/RCVD: CPT | Performed by: INTERNAL MEDICINE

## 2021-10-04 PROCEDURE — 90670 PCV13 VACCINE IM: CPT | Performed by: INTERNAL MEDICINE

## 2021-10-04 PROCEDURE — G0402 INITIAL PREVENTIVE EXAM: HCPCS | Performed by: INTERNAL MEDICINE

## 2021-10-04 PROCEDURE — 1036F TOBACCO NON-USER: CPT | Performed by: INTERNAL MEDICINE

## 2021-10-04 PROCEDURE — G0008 ADMIN INFLUENZA VIRUS VAC: HCPCS | Performed by: INTERNAL MEDICINE

## 2021-10-04 PROCEDURE — 90662 IIV NO PRSV INCREASED AG IM: CPT | Performed by: INTERNAL MEDICINE

## 2021-10-04 PROCEDURE — 3288F FALL RISK ASSESSMENT DOCD: CPT | Performed by: INTERNAL MEDICINE

## 2021-10-04 PROCEDURE — G0403 EKG FOR INITIAL PREVENT EXAM: HCPCS | Performed by: INTERNAL MEDICINE

## 2021-10-04 PROCEDURE — 1123F ACP DISCUSS/DSCN MKR DOCD: CPT

## 2021-10-04 PROCEDURE — 3008F BODY MASS INDEX DOCD: CPT | Performed by: INTERNAL MEDICINE

## 2021-10-04 RX ORDER — DIPHENOXYLATE HYDROCHLORIDE AND ATROPINE SULFATE 2.5; .025 MG/1; MG/1
1 TABLET ORAL DAILY
COMMUNITY

## 2021-10-05 ENCOUNTER — TELEPHONE (OUTPATIENT)
Dept: ADMINISTRATIVE | Facility: OTHER | Age: 66
End: 2021-10-05

## 2021-10-06 ENCOUNTER — TELEPHONE (OUTPATIENT)
Dept: SLEEP CENTER | Facility: CLINIC | Age: 66
End: 2021-10-06

## 2021-11-16 DIAGNOSIS — F32.5 MAJOR DEPRESSIVE DISORDER WITH SINGLE EPISODE, IN FULL REMISSION (HCC): ICD-10-CM

## 2021-11-16 RX ORDER — CITALOPRAM 20 MG/1
TABLET ORAL
Qty: 90 TABLET | Refills: 3 | Status: SHIPPED | OUTPATIENT
Start: 2021-11-16

## 2021-12-27 DIAGNOSIS — I10 HYPERTENSION, ESSENTIAL, BENIGN: ICD-10-CM

## 2021-12-27 RX ORDER — LISINOPRIL 10 MG/1
10 TABLET ORAL DAILY
Qty: 90 TABLET | Refills: 1 | Status: SHIPPED | OUTPATIENT
Start: 2021-12-27 | End: 2022-06-29

## 2021-12-27 RX ORDER — LISINOPRIL 10 MG/1
10 TABLET ORAL DAILY
Qty: 14 TABLET | Refills: 0 | Status: SHIPPED | OUTPATIENT
Start: 2021-12-27 | End: 2022-04-29 | Stop reason: SDUPTHER

## 2022-03-21 ENCOUNTER — HOSPITAL ENCOUNTER (OUTPATIENT)
Dept: RADIOLOGY | Age: 67
Discharge: HOME | End: 2022-03-21
Payer: COMMERCIAL

## 2022-03-21 DIAGNOSIS — N95.9 UNSPECIFIED MENOPAUSAL AND PERIMENOPAUSAL DISORDER: ICD-10-CM

## 2022-03-21 DIAGNOSIS — Z13.820 SCREENING FOR OSTEOPOROSIS: ICD-10-CM

## 2022-03-21 LAB — HBA1C MFR BLD HPLC: 7.8 %

## 2022-03-21 PROCEDURE — 77080 DXA BONE DENSITY AXIAL: CPT

## 2022-04-13 ENCOUNTER — RA CDI HCC (OUTPATIENT)
Dept: OTHER | Facility: HOSPITAL | Age: 67
End: 2022-04-13

## 2022-04-13 NOTE — PROGRESS NOTES
E66 01  [facility]  coding opportunities          Chart Reviewed number of suggestions sent to Provider: 1     Patients Insurance     Medicare Insurance: [employer]

## 2022-04-29 ENCOUNTER — OFFICE VISIT (OUTPATIENT)
Dept: INTERNAL MEDICINE CLINIC | Facility: CLINIC | Age: 67
End: 2022-04-29
Payer: COMMERCIAL

## 2022-04-29 VITALS
DIASTOLIC BLOOD PRESSURE: 82 MMHG | HEIGHT: 67 IN | SYSTOLIC BLOOD PRESSURE: 124 MMHG | BODY MASS INDEX: 35.09 KG/M2 | OXYGEN SATURATION: 98 % | WEIGHT: 223.6 LBS | HEART RATE: 83 BPM | RESPIRATION RATE: 16 BRPM | TEMPERATURE: 97.2 F

## 2022-04-29 DIAGNOSIS — H60.63 CHRONIC OTITIS EXTERNA OF BOTH EARS, UNSPECIFIED TYPE: ICD-10-CM

## 2022-04-29 DIAGNOSIS — I87.2 STASIS DERMATITIS OF BOTH LEGS: Primary | ICD-10-CM

## 2022-04-29 DIAGNOSIS — E08.65 DIABETES MELLITUS DUE TO UNDERLYING CONDITION, UNCONTROLLED, WITH HYPERGLYCEMIA (HCC): ICD-10-CM

## 2022-04-29 PROCEDURE — 99214 OFFICE O/P EST MOD 30 MIN: CPT | Performed by: INTERNAL MEDICINE

## 2022-04-29 PROCEDURE — 1101F PT FALLS ASSESS-DOCD LE1/YR: CPT | Performed by: INTERNAL MEDICINE

## 2022-04-29 RX ORDER — DULAGLUTIDE 1.5 MG/.5ML
INJECTION, SOLUTION SUBCUTANEOUS
COMMUNITY
Start: 2022-04-25

## 2022-04-29 RX ORDER — MAGNESIUM 30 MG
200 TABLET ORAL 3 TIMES WEEKLY
COMMUNITY

## 2022-04-29 RX ORDER — BIOTIN 5 MG
TABLET ORAL DAILY
COMMUNITY

## 2022-04-29 RX ORDER — TRIAMCINOLONE ACETONIDE 1 MG/G
CREAM TOPICAL 2 TIMES DAILY
Qty: 80 G | Refills: 0 | Status: SHIPPED | OUTPATIENT
Start: 2022-04-29

## 2022-04-29 NOTE — ASSESSMENT & PLAN NOTE
Lab Results   Component Value Date    HGBA1C 7 1 (H) 02/19/2021   Managed by endocrine  Declines statin therapy

## 2022-04-29 NOTE — PROGRESS NOTES
Assessment/Plan:    Diabetes mellitus due to underlying condition, uncontrolled, with hyperglycemia ([facility] )    Lab Results   Component Value Date    HGBA1C 7 1 (H) 02/19/2021   Managed by endocrine  Declines statin therapy    Colonoscopy 2017 at [facility]     Problem List Items Addressed This Visit        Endocrine    Diabetes mellitus due to underlying condition, uncontrolled, with hyperglycemia ([facility] [address] )       Lab Results   Component Value Date    HGBA1C 7 1 (H) 02/19/2021   Managed by endocrine  Declines statin therapy         Relevant Medications    Trulicity 1 5 RV/0 7FW SOPN      Other Visit Diagnoses     Stasis dermatitis of both legs    -  Primary    Relevant Medications    triamcinolone (KENALOG) 0 1 % cream    Chronic otitis externa of both ears, unspecified type        Relevant Medications    neomycin-polymyxin-hydrocortisone (CORTISPORIN) otic solution    Other Relevant Orders    Ambulatory Referral to Otolaryngology            Subjective:      Patient ID: Manda Roberts is a 73 y o  female  HPI  Relief from CBD balm she applies to her knee  Chronic low back pain non radiating, interested in seeing a chiropractor again  No relief from previous PT  She takes Tylenol which helps  Small wound on the left shin that is new, no known injury  Chronic LE edema with redness that she has been prescribed triamcinolone cream  +varicose veins  Constant ear itching, intermittent sharp ear ache on the right; itching only on the left  Hearing is fine, no ringing  Community vascular screening normal-no carotid disease    The following portions of the patient's history were reviewed and updated as appropriate: allergies, current medications, past family history, past medical history, past social history, past surgical history and problem list     Review of Systems   Constitutional: Negative for chills and fever  HENT: Negative for congestion, hearing loss, postnasal drip, rhinorrhea and tinnitus      Respiratory: Negative for cough and shortness of breath  Cardiovascular: Positive for leg swelling  Negative for chest pain and palpitations  Gastrointestinal: Negative for abdominal pain, constipation and diarrhea  Endocrine: Negative for polydipsia and polyuria  Musculoskeletal: Positive for back pain  Neurological: Positive for headaches (occasional)  Negative for dizziness  Objective:      /82   Pulse 83   Temp (!) 97 2 °F (36 2 °C)   Resp 16   Ht 5' 7" (1 702 m)   Wt 101 kg (223 lb 9 6 oz)   SpO2 98%   BMI 35 02 kg/m²          Physical Exam  Constitutional:       General: She is not in acute distress  Appearance: She is well-developed  She is not ill-appearing, toxic-appearing or diaphoretic  HENT:      Head: Normocephalic and atraumatic  Right Ear: External ear normal  There is no impacted cerumen  Left Ear: External ear normal  There is no impacted cerumen  Ears:      Comments: Right ear canal with mild erythema  Eyes:      Conjunctiva/sclera: Conjunctivae normal    Cardiovascular:      Rate and Rhythm: Normal rate and regular rhythm  Heart sounds: Normal heart sounds  No murmur heard  Comments: Spider veins on the right leg  Pulmonary:      Effort: Pulmonary effort is normal  No respiratory distress  Breath sounds: Normal breath sounds  No wheezing or rales  Abdominal:      General: There is no distension  Palpations: Abdomen is soft  There is no mass  Tenderness: There is no abdominal tenderness  There is no guarding or rebound  Musculoskeletal:      Cervical back: Neck supple  Right lower leg: Edema (trace) present  Left lower leg: Edema (trace) present  Skin:     Findings: Erythema (mild over both shins) present  Neurological:      Mental Status: She is alert and oriented to person, place, and time  Psychiatric:         Mood and Affect: Mood normal          Behavior: Behavior normal          Thought Content:  Thought content normal  Judgment: Judgment normal

## 2022-05-02 ENCOUNTER — TELEPHONE (OUTPATIENT)
Dept: ADMINISTRATIVE | Facility: OTHER | Age: 67
End: 2022-05-02

## 2022-05-02 NOTE — TELEPHONE ENCOUNTER
Upon review of the In Basket request we were able to locate, review, and update the patient chart as requested for Hemoglobin A1c  Any additional questions or concerns should be emailed to the Practice Liaisons via [email or web address] email, please do not reply via In Basket      Thank you  Geneva Pike, [address] [place] [address] Doris Brady

## 2022-05-02 NOTE — TELEPHONE ENCOUNTER
----- Message from Juno White LPN sent at 3/49/3609  4:11 PM EDT -----  Regarding: care gap request  04/29/22 4:11 PM    Hello, our patient attached above has had Hemoglobin A1c completed/performed  Please assist in updating the patient chart by pulling the Care Everywhere (CE) document  The date of service is 03/21/2022       Thank you,  Juno White LPN  [facility]

## 2022-06-29 DIAGNOSIS — I10 HYPERTENSION, ESSENTIAL, BENIGN: ICD-10-CM

## 2022-06-29 RX ORDER — LISINOPRIL 10 MG/1
TABLET ORAL
Qty: 90 TABLET | Refills: 3 | Status: SHIPPED | OUTPATIENT
Start: 2022-06-29

## 2022-09-26 ENCOUNTER — TELEPHONE (OUTPATIENT)
Dept: INTERNAL MEDICINE CLINIC | Facility: CLINIC | Age: 67
End: 2022-09-26

## 2022-09-26 DIAGNOSIS — E08.65 DIABETES MELLITUS DUE TO UNDERLYING CONDITION, UNCONTROLLED, WITH HYPERGLYCEMIA (HCC): Primary | ICD-10-CM

## 2022-09-26 NOTE — TELEPHONE ENCOUNTER
Patient seeing you 10/10/22 calling to for order for bloodwork for HNL labs  Please advise  [General Appearance - Alert] : alert [General Appearance - In No Acute Distress] : in no acute distress [General Appearance - Well Nourished] : well nourished [General Appearance - Well Developed] : well developed [Sclera] : the sclera and conjunctiva were normal [Outer Ear] : the ears and nose were normal in appearance [Neck Appearance] : the appearance of the neck was normal [Respiration, Rhythm And Depth] : normal respiratory rhythm and effort [Exaggerated Use Of Accessory Muscles For Inspiration] : no accessory muscle use [Heart Rate And Rhythm] : heart rate was normal and rhythm regular [Heart Sounds] : normal S1 and S2 [Abdomen Soft] : soft [No CVA Tenderness] : no ~M costovertebral angle tenderness [No Spinal Tenderness] : no spinal tenderness [Abnormal Walk] : normal gait [Nail Clubbing] : no clubbing  or cyanosis of the fingernails [Musculoskeletal - Swelling] : no joint swelling seen [Motor Tone] : muscle strength and tone were normal [] : no rash [Skin Lesions] : no skin lesions [Affect] : the affect was normal [No Focal Deficits] : no focal deficits [Impaired Insight] : insight and judgment were intact [FreeTextEntry1] :  b/l quad muscle weakness, flat and pronated b/l feet more on right side, tender to palpate no active synovitis, mild tenderness to Achilles' insertion. Left wrist with possible ganglion cyst.

## 2022-10-02 ENCOUNTER — RA CDI HCC (OUTPATIENT)
Dept: OTHER | Facility: HOSPITAL | Age: 67
End: 2022-10-02

## 2022-10-02 NOTE — PROGRESS NOTES
Previous suggestion  used  [facility]  coding opportunities       Chart reviewed, no opportunity found: CHART REVIEWED, NO OPPORTUNITY FOUND        Patients Insurance     Medicare Insurance: [employer]

## 2022-10-06 ENCOUNTER — APPOINTMENT (OUTPATIENT)
Dept: LAB | Facility: CLINIC | Age: 67
End: 2022-10-06
Payer: COMMERCIAL

## 2022-10-06 DIAGNOSIS — E08.65 DIABETES MELLITUS DUE TO UNDERLYING CONDITION, UNCONTROLLED, WITH HYPERGLYCEMIA (HCC): ICD-10-CM

## 2022-10-06 LAB
ALBUMIN SERPL BCP-MCNC: 3.2 G/DL (ref 3.5–5)
ALP SERPL-CCNC: 93 U/L (ref 46–116)
ALT SERPL W P-5'-P-CCNC: 33 U/L (ref 12–78)
ANION GAP SERPL CALCULATED.3IONS-SCNC: 7 MMOL/L (ref 4–13)
AST SERPL W P-5'-P-CCNC: 16 U/L (ref 5–45)
BASOPHILS # BLD AUTO: 0.04 THOUSANDS/ΜL (ref 0–0.1)
BASOPHILS NFR BLD AUTO: 1 % (ref 0–1)
BILIRUB SERPL-MCNC: 0.5 MG/DL (ref 0.2–1)
BUN SERPL-MCNC: 20 MG/DL (ref 5–25)
CALCIUM ALBUM COR SERPL-MCNC: 10 MG/DL (ref 8.3–10.1)
CALCIUM SERPL-MCNC: 9.4 MG/DL (ref 8.3–10.1)
CHLORIDE SERPL-SCNC: 103 MMOL/L (ref 96–108)
CHOLEST SERPL-MCNC: 157 MG/DL
CO2 SERPL-SCNC: 26 MMOL/L (ref 21–32)
CREAT SERPL-MCNC: 1.03 MG/DL (ref 0.6–1.3)
CREAT UR-MCNC: 95.7 MG/DL
EOSINOPHIL # BLD AUTO: 0.19 THOUSAND/ΜL (ref 0–0.61)
EOSINOPHIL NFR BLD AUTO: 3 % (ref 0–6)
ERYTHROCYTE [DISTWIDTH] IN BLOOD BY AUTOMATED COUNT: 12.6 % (ref 11.6–15.1)
GFR SERPL CREATININE-BSD FRML MDRD: 56 ML/MIN/1.73SQ M
GLUCOSE P FAST SERPL-MCNC: 217 MG/DL (ref 65–99)
HCT VFR BLD AUTO: 40.8 % (ref 34.8–46.1)
HDLC SERPL-MCNC: 55 MG/DL
HGB BLD-MCNC: 13.2 G/DL (ref 11.5–15.4)
IMM GRANULOCYTES # BLD AUTO: 0.03 THOUSAND/UL (ref 0–0.2)
IMM GRANULOCYTES NFR BLD AUTO: 0 % (ref 0–2)
LDLC SERPL CALC-MCNC: 79 MG/DL (ref 0–100)
LYMPHOCYTES # BLD AUTO: 1.82 THOUSANDS/ΜL (ref 0.6–4.47)
LYMPHOCYTES NFR BLD AUTO: 25 % (ref 14–44)
MCH RBC QN AUTO: 30.1 PG (ref 26.8–34.3)
MCHC RBC AUTO-ENTMCNC: 32.4 G/DL (ref 31.4–37.4)
MCV RBC AUTO: 93 FL (ref 82–98)
MICROALBUMIN UR-MCNC: 16 MG/L (ref 0–20)
MICROALBUMIN/CREAT 24H UR: 17 MG/G CREATININE (ref 0–30)
MONOCYTES # BLD AUTO: 0.36 THOUSAND/ΜL (ref 0.17–1.22)
MONOCYTES NFR BLD AUTO: 5 % (ref 4–12)
NEUTROPHILS # BLD AUTO: 4.78 THOUSANDS/ΜL (ref 1.85–7.62)
NEUTS SEG NFR BLD AUTO: 66 % (ref 43–75)
NRBC BLD AUTO-RTO: 0 /100 WBCS
PLATELET # BLD AUTO: 278 THOUSANDS/UL (ref 149–390)
PMV BLD AUTO: 10.1 FL (ref 8.9–12.7)
POTASSIUM SERPL-SCNC: 4.5 MMOL/L (ref 3.5–5.3)
PROT SERPL-MCNC: 7.5 G/DL (ref 6.4–8.4)
RBC # BLD AUTO: 4.38 MILLION/UL (ref 3.81–5.12)
SODIUM SERPL-SCNC: 136 MMOL/L (ref 135–147)
TRIGL SERPL-MCNC: 115 MG/DL
TSH SERPL DL<=0.05 MIU/L-ACNC: 2.02 UIU/ML (ref 0.45–4.5)
WBC # BLD AUTO: 7.22 THOUSAND/UL (ref 4.31–10.16)

## 2022-10-06 PROCEDURE — 83036 HEMOGLOBIN GLYCOSYLATED A1C: CPT

## 2022-10-06 PROCEDURE — 36415 COLL VENOUS BLD VENIPUNCTURE: CPT

## 2022-10-06 PROCEDURE — 82570 ASSAY OF URINE CREATININE: CPT | Performed by: INTERNAL MEDICINE

## 2022-10-06 PROCEDURE — 80053 COMPREHEN METABOLIC PANEL: CPT

## 2022-10-06 PROCEDURE — 85025 COMPLETE CBC W/AUTO DIFF WBC: CPT

## 2022-10-06 PROCEDURE — 80061 LIPID PANEL: CPT

## 2022-10-06 PROCEDURE — 84443 ASSAY THYROID STIM HORMONE: CPT

## 2022-10-06 PROCEDURE — 82043 UR ALBUMIN QUANTITATIVE: CPT | Performed by: INTERNAL MEDICINE

## 2022-10-07 LAB
EST. AVERAGE GLUCOSE BLD GHB EST-MCNC: 223 MG/DL
HBA1C MFR BLD: 9.4 %

## 2022-10-28 ENCOUNTER — OFFICE VISIT (OUTPATIENT)
Dept: INTERNAL MEDICINE CLINIC | Facility: CLINIC | Age: 67
End: 2022-10-28

## 2022-10-28 VITALS
HEART RATE: 91 BPM | SYSTOLIC BLOOD PRESSURE: 140 MMHG | WEIGHT: 226 LBS | OXYGEN SATURATION: 98 % | DIASTOLIC BLOOD PRESSURE: 90 MMHG | HEIGHT: 67 IN | BODY MASS INDEX: 35.47 KG/M2

## 2022-10-28 DIAGNOSIS — Z90.710 H/O TOTAL HYSTERECTOMY: ICD-10-CM

## 2022-10-28 DIAGNOSIS — Z23 ENCOUNTER FOR IMMUNIZATION: ICD-10-CM

## 2022-10-28 DIAGNOSIS — I73.00 RAYNAUD'S DISEASE WITHOUT GANGRENE: ICD-10-CM

## 2022-10-28 DIAGNOSIS — E11.65 TYPE 2 DIABETES MELLITUS WITH HYPERGLYCEMIA, WITHOUT LONG-TERM CURRENT USE OF INSULIN (HCC): ICD-10-CM

## 2022-10-28 DIAGNOSIS — I10 HYPERTENSION, ESSENTIAL, BENIGN: ICD-10-CM

## 2022-10-28 DIAGNOSIS — N85.00 ENDOMETRIAL HYPERPLASIA: ICD-10-CM

## 2022-10-28 DIAGNOSIS — Z00.00 MEDICARE ANNUAL WELLNESS VISIT, INITIAL: Primary | ICD-10-CM

## 2022-10-28 DIAGNOSIS — E66.01 CLASS 2 SEVERE OBESITY WITH SERIOUS COMORBIDITY AND BODY MASS INDEX (BMI) OF 36.0 TO 36.9 IN ADULT, UNSPECIFIED OBESITY TYPE (HCC): ICD-10-CM

## 2022-10-28 RX ORDER — ATORVASTATIN CALCIUM 10 MG/1
10 TABLET, FILM COATED ORAL DAILY
Qty: 90 TABLET | Refills: 1 | Status: SHIPPED | OUTPATIENT
Start: 2022-10-28

## 2022-10-28 NOTE — PROGRESS NOTES
Assessment and Plan:   Pneumococcal vaccine another time  Unable to recall who performed colonoscopy at 64yo  Records not seen on [facility]  Problem List Items Addressed This Visit        Endocrine    Type 2 diabetes mellitus with hyperglycemia, without long-term current use of insulin ([facility] )     Uncontrolled since stopping Trulicity due to cost  Just started glipizide by endocrinology  Continue metformin  She is on lisinopril  Agrees to starting a statin at a low dose  Check lipids with nest set of labs  Lab Results   Component Value Date    HGBA1C 9 4 (H) 10/06/2022            Relevant Medications    atorvastatin (LIPITOR) 10 mg tablet    Other Relevant Orders    Lipid Panel with Direct LDL reflex    Comprehensive metabolic panel       Cardiovascular and Mediastinum    Hypertension, essential, benign     Continue lisinopril         Raynaud's disease without gangrene     Discussed starting amlodipine which she declined at this time            Other    Class 2 severe obesity with serious comorbidity and body mass index (BMI) of 36 0 to 36 9 in adult, unspecified obesity type ([facility] )      Other Visit Diagnoses     Medicare annual wellness visit, initial    -  Primary    Encounter for immunization        Relevant Orders    influenza vaccine, high-dose, PF 0 7 mL (FLUZONE HIGH-DOSE) (Completed)    Endometrial hyperplasia        Relevant Orders    Ambulatory Referral to Obstetrics / Gynecology    H/O total hysterectomy        Relevant Orders    Ambulatory Referral to Obstetrics / Gynecology        BMI Counseling: Body mass index is 35 4 kg/m²  The BMI is above normal  Nutrition recommendations include moderation in carbohydrate intake and reducing intake of saturated and trans fat  Exercise recommendations include exercising 3-5 times per week  Rationale for BMI follow-up plan is due to patient being overweight or obese         Preventive health issues were discussed with patient, and age appropriate screening tests were ordered as noted in patient's After Visit Summary  Personalized health advice and appropriate referrals for health education or preventive services given if needed, as noted in patient's After Visit Summary  History of Present Illness:     Patient presents for a Medicare Wellness Visit    HPI   Patient Care Team:  Madison Wilson MD as PCP - General (Internal Medicine)     Review of Systems:     Review of Systems   Constitutional: Negative for chills, fever and unexpected weight change  Respiratory: Negative for shortness of breath  Cardiovascular: Negative for chest pain, palpitations and leg swelling  Hands and feet cold year round   Gastrointestinal: Negative for abdominal pain, constipation and diarrhea  Genitourinary: Negative for difficulty urinating and vaginal bleeding  Neurological: Negative for dizziness and headaches  Problem List:     Patient Active Problem List   Diagnosis   • Hypertension, essential, benign   • Raynaud's disease without gangrene   • Obesity (BMI 30 0-34  9)   • BLANCA (obstructive sleep apnea)   • Multiple thyroid nodules   • Major depressive disorder with single episode, in full remission ([facility])   • Non-seasonal allergic rhinitis   • Class 2 severe obesity with serious comorbidity and body mass index (BMI) of 36 0 to 36 9 in adult, unspecified obesity type ([facility] [address] )   • Type 2 diabetes mellitus with hyperglycemia, without long-term current use of insulin ([facility])      Past Medical and Surgical History:     Past Medical History:   Diagnosis Date   • Complex endometrial hyperplasia    • Diabetes mellitus ([address] )    • Diabetes mellitus due to underlying condition, uncontrolled, with hyperglycemia ([address] ) 8/10/2020   • Hypertension    • Pneumonia      Past Surgical History:   Procedure Laterality Date   • HYSTERECTOMY  03/2017    Dr Adelene Leventhal   • TOTAL ABDOMINAL HYSTERECTOMY W/ BILATERAL SALPINGOOPHORECTOMY        Family History:     Family History   Problem Relation Age of Onset • Diabetes Father    • Hypertension Father    • Prostate cancer Father    • Breast cancer Mother    • Hyperlipidemia Mother    • Bone cancer Mother    • Breast cancer Maternal Grandmother    • Liver cancer Paternal Grandfather       Social History:     Social History     Socioeconomic History   • Marital status: /Civil Union     Spouse name: None   • Number of children: None   • Years of education: None   • Highest education level: None   Occupational History   • None   Tobacco Use   • Smoking status: Never Smoker   • Smokeless tobacco: Never Used   Vaping Use   • Vaping Use: Never used   Substance and Sexual Activity   • Alcohol use: Yes   • Drug use: Never   • Sexual activity: None   Other Topics Concern   • None   Social History Narrative   • None     Social Determinants of Health     Financial Resource Strain: Low Risk    • Difficulty of Paying Living Expenses: Not hard at all   Food Insecurity: Not on file   Transportation Needs: No Transportation Needs   • Lack of Transportation (Medical): No   • Lack of Transportation (Non-Medical):  No   Physical Activity: Not on file   Stress: Not on file   Social Connections: Not on file   Intimate Partner Violence: Not on file   Housing Stability: Not on file      Medications and Allergies:     Current Outpatient Medications   Medication Sig Dispense Refill   • Aspirin Buf,CaCarb-MgCarb-MgO, 81 MG TABS Take 1 tablet by mouth daily     • atorvastatin (LIPITOR) 10 mg tablet Take 1 tablet (10 mg total) by mouth daily 90 tablet 1   • Cholecalciferol 50 MCG (2000 UT) TABS Take by mouth daily      • citalopram (CeleXA) 20 mg tablet TAKE 1 TABLET DAILY 90 tablet 3   • fluticasone (Flonase) 50 mcg/act nasal spray 2 sprays into each nostril daily 3 Bottle 2   • glipiZIDE (GLUCOTROL) 5 mg tablet Take 5 mg by mouth     • Krill Oil 1000 MG CAPS Take by mouth in the morning     • lisinopril (ZESTRIL) 10 mg tablet TAKE 1 TABLET DAILY 90 tablet 3   • magnesium 30 MG tablet Take 200 mg by mouth 3 (three) times a week      • metFORMIN (GLUCOPHAGE) 500 mg tablet Take 1 tablet by mouth 2 (two) times a day     • multivitamin (THERAGRAN) TABS Take 1 tablet by mouth daily     • OneTouch Ultra test strip      • triamcinolone (KENALOG) 0 1 % cream Apply topically 2 (two) times a day 80 g 0   • vitamin B-12 (VITAMIN B-12) 500 mcg tablet Take 1 tablet by mouth daily     • albuterol (PROVENTIL HFA,VENTOLIN HFA) 90 mcg/act inhaler Inhale 2 puffs every 4 (four) hours as needed (Patient not taking: No sig reported)     • neomycin-polymyxin-hydrocortisone (CORTISPORIN) otic solution Administer 3 drops to the right ear every 8 (eight) hours (Patient not taking: Reported on 10/28/2022) 10 mL 0   • Trulicity 1 5 WL/9 6QG SOPN  (Patient not taking: Reported on 10/28/2022)       No current facility-administered medications for this visit       Allergies   Allergen Reactions   • Amoxicillin Nausea Only   • Sulfamethoxazole-Trimethoprim      rash   • Tramadol Nausea Only     Other reaction(s): Nausea and/or vomiting   • Omeprazole Rash   • Penicillins Itching and Rash     Reaction Date: 20Dec2011;   tolerates ceftriaxone        Immunizations:     Immunization History   Administered Date(s) Administered   • COVID-19 MODERNA VACC 0 5 ML IM 04/30/2021, 05/28/2021   • COVID-19 PFIZER VACCINE 0 3 ML IM 01/27/2022   • INFLUENZA 02/17/2014, 10/27/2014, 09/21/2015, 01/30/2017, 12/10/2018, 01/20/2020, 10/15/2021   • Influenza, high dose seasonal 0 7 mL 10/04/2021, 10/28/2022   • Influenza, seasonal, injectable 11/30/2012   • Pneumococcal Conjugate 13-Valent 10/04/2021   • Pneumococcal Polysaccharide PPV23 07/14/2013, 07/16/2013   • Td (adult), Unspecified 06/22/2014   • Tdap 09/26/2016   • Zoster Vaccine Recombinant 07/01/2019, 02/06/2020      Health Maintenance:         Topic Date Due   • Colorectal Cancer Screening  Never done   • Breast Cancer Screening: Mammogram  11/15/2022   • Hepatitis C Screening  Completed Topic Date Due   • COVID-19 Vaccine (4 - Booster for Moderna series) 05/27/2022   • Pneumococcal Vaccine: 65+ Years (3 - PPSV23 or PCV20) 10/04/2022      Medicare Screening Tests and Risk Assessments:     Aby Valles is here for her Initial Wellness visit  Health Risk Assessment:   Patient rates overall health as good  Patient feels that their physical health rating is same  Patient is very satisfied with their life  Eyesight was rated as same  Hearing was rated as same  Patient feels that their emotional and mental health rating is same  Patients states they are never, rarely angry  Patient states they are always unusually tired/fatigued  Pain experienced in the last 7 days has been some  Patient's pain rating has been 4/10  Patient states that she has experienced no weight loss or gain in last 6 months  Depression Screening:   PHQ-9 Score: 3      Fall Risk Screening: In the past year, patient has experienced: no history of falling in past year      Urinary Incontinence Screening:   Patient has not leaked urine accidently in the last six months  Home Safety:  Patient does not have trouble with stairs inside or outside of their home  Patient has working smoke alarms and has working carbon monoxide detector  Home safety hazards include: loose rugs on the floor  Nutrition:   Current diet is Low Carb  Medications:   Patient is currently taking over-the-counter supplements  OTC medications include: see medication list  Patient is able to manage medications  Activities of Daily Living (ADLs)/Instrumental Activities of Daily Living (IADLs):   Walk and transfer into and out of bed and chair?: Yes  Dress and groom yourself?: Yes    Bathe or shower yourself?: Yes    Feed yourself?  Yes  Do your laundry/housekeeping?: Yes  Manage your money, pay your bills and track your expenses?: Yes  Make your own meals?: Yes    Do your own shopping?: Yes    Previous Hospitalizations:   Any hospitalizations or ED visits within the last 12 months?: No      Advance Care Planning:   Living will: Yes    Durable POA for healthcare: Yes    Advanced directive: Yes      Cognitive Screening:   Provider or family/friend/caregiver concerned regarding cognition?: No    PREVENTIVE SCREENINGS      Cardiovascular Screening:    General: Screening Current      Diabetes Screening:     General: Screening Not Indicated and History Diabetes      Colorectal Cancer Screening:     General: Screening Current      Breast Cancer Screening:     General: Screening Current      Cervical Cancer Screening:    General: Screening Not Indicated      Osteoporosis Screening:    General: Screening Current      Abdominal Aortic Aneurysm (AAA) Screening:        General: Screening Not Indicated      Lung Cancer Screening:     General: Screening Not Indicated      Hepatitis C Screening:    General: Screening Current    Screening, Brief Intervention, and Referral to Treatment (SBIRT)    Screening  Typical number of drinks in a day: 0  Typical number of drinks in a week: 0  Interpretation: Low risk drinking behavior  Single Item Drug Screening:  How often have you used an illegal drug (including marijuana) or a prescription medication for non-medical reasons in the past year? never    Single Item Drug Screen Score: 0  Interpretation: Negative screen for possible drug use disorder    No exam data present     Physical Exam:     /90 (BP Location: Left arm, Patient Position: Sitting, Cuff Size: Standard)   Pulse 91   Ht 5' 7" (1 702 m)   Wt 103 kg (226 lb)   SpO2 98%   BMI 35 40 kg/m²     Physical Exam  Constitutional:       General: She is not in acute distress  Appearance: She is well-developed  She is obese  She is not ill-appearing, toxic-appearing or diaphoretic  HENT:      Head: Normocephalic and atraumatic  Right Ear: External ear normal  There is no impacted cerumen  Left Ear: External ear normal  There is no impacted cerumen     Eyes: Conjunctiva/sclera: Conjunctivae normal    Cardiovascular:      Rate and Rhythm: Normal rate and regular rhythm  Pulses: no weak pulses          Dorsalis pedis pulses are 2+ on the right side and 2+ on the left side  Heart sounds: Normal heart sounds  No murmur heard  Pulmonary:      Effort: Pulmonary effort is normal  No respiratory distress  Breath sounds: Normal breath sounds  No stridor  No wheezing or rales  Abdominal:      General: There is no distension  Palpations: Abdomen is soft  There is no mass  Tenderness: There is no abdominal tenderness  There is no guarding or rebound  Musculoskeletal:      Cervical back: Neck supple  Right lower leg: No edema  Left lower leg: No edema  Feet:      Right foot:      Skin integrity: No ulcer, skin breakdown, erythema, warmth, callus or dry skin  Left foot:      Skin integrity: No ulcer, skin breakdown, erythema, warmth, callus or dry skin  Skin:     General: Skin is warm and dry  Neurological:      Mental Status: She is alert and oriented to person, place, and time  Psychiatric:         Mood and Affect: Mood normal          Behavior: Behavior normal          Thought Content: Thought content normal          Judgment: Judgment normal         Patient's shoes and socks removed  Right Foot/Ankle   Right Foot Inspection  Skin Exam: skin normal, skin intact and abnormal color  No dry skin, no warmth, no callus, no erythema, no maceration, no pre-ulcer, no ulcer and no callus  Toe Exam: ROM and strength within normal limits  Sensory   Monofilament testing: intact    Vascular  The right DP pulse is 2+  Left Foot/Ankle  Left Foot Inspection  Skin Exam: skin normal, skin intact and abnormal color  No dry skin, no warmth, no erythema, no maceration, no pre-ulcer, no ulcer and no callus  Toe Exam: ROM and strength within normal limits       Sensory   Monofilament testing: intact    Vascular  The left DP pulse is 2+       Assign Risk Category  No deformity present  No loss of protective sensation  No weak pulses  Risk: 0  =  Sunil Berrios MD

## 2022-10-28 NOTE — PATIENT INSTRUCTIONS
Medicare Preventive Visit Patient Instructions  Thank you for completing your Welcome to Medicare Visit or Medicare Annual Wellness Visit today  Your next wellness visit will be due in one year (10/29/2023)  The screening/preventive services that you may require over the next 5-10 years are detailed below  Some tests may not apply to you based off risk factors and/or age  Screening tests ordered at today's visit but not completed yet may show as past due  Also, please note that scanned in results may not display below  Preventive Screenings:  Service Recommendations Previous Testing/Comments   Colorectal Cancer Screening  * Colonoscopy    * Fecal Occult Blood Test (FOBT)/Fecal Immunochemical Test (FIT)  * Fecal DNA/Cologuard Test  * Flexible Sigmoidoscopy Age: 39-70 years old   Colonoscopy: every 10 years (may be performed more frequently if at higher risk)  OR  FOBT/FIT: every 1 year  OR  Cologuard: every 3 years  OR  Sigmoidoscopy: every 5 years  Screening may be recommended earlier than age 39 if at higher risk for colorectal cancer  Also, an individualized decision between you and your healthcare provider will decide whether screening between the ages of 74-80 would be appropriate  Colonoscopy: 01/11/2016  FOBT/FIT: Not on file  Cologuard: Not on file  Sigmoidoscopy: Not on file          Breast Cancer Screening Age: 36 years old  Frequency: every 1-2 years  Not required if history of left and right mastectomy Mammogram: 11/15/2021        Cervical Cancer Screening Between the ages of 21-29, pap smear recommended once every 3 years  Between the ages of 33-67, can perform pap smear with HPV co-testing every 5 years     Recommendations may differ for women with a history of total hysterectomy, cervical cancer, or abnormal pap smears in past  Pap Smear: Not on file        Hepatitis C Screening Once for adults born between [facility]  More frequently in patients at high risk for Hepatitis C Hep C Antibody: 04/06/2018        Diabetes Screening 1-2 times per year if you're at risk for diabetes or have pre-diabetes Fasting glucose: 217 mg/dL (10/6/2022)  A1C: 9 4 % (10/6/2022)      Cholesterol Screening Once every 5 years if you don't have a lipid disorder  May order more often based on risk factors  Lipid panel: 10/06/2022          Other Preventive Screenings Covered by Medicare:  1  Abdominal Aortic Aneurysm (AAA) Screening: covered once if your at risk  You're considered to be at risk if you have a family history of AAA  2  Lung Cancer Screening: covers low dose CT scan once per year if you meet all of the following conditions: (1) Age 50-69; (2) No signs or symptoms of lung cancer; (3) Current smoker or have quit smoking within the last 15 years; (4) You have a tobacco smoking history of at least 20 pack years (packs per day multiplied by number of years you smoked); (5) You get a written order from a healthcare provider  3  Glaucoma Screening: covered annually if you're considered high risk: (1) You have diabetes OR (2) Family history of glaucoma OR (3)  aged 48 and older OR (3)  American aged 72 and older  3  Osteoporosis Screening: covered every 2 years if you meet one of the following conditions: (1) You're estrogen deficient and at risk for osteoporosis based off medical history and other findings; (2) Have a vertebral abnormality; (3) On glucocorticoid therapy for more than 3 months; (4) Have primary hyperparathyroidism; (5) On osteoporosis medications and need to assess response to drug therapy  · Last bone density test (DXA Scan): 03/21/2022   5  HIV Screening: covered annually if you're between the age of 15-65  Also covered annually if you are younger than 13 and older than 72 with risk factors for HIV infection  For pregnant patients, it is covered up to 3 times per pregnancy      Immunizations:  Immunization Recommendations   Influenza Vaccine Annual influenza vaccination during flu season is recommended for all persons aged >= 6 months who do not have contraindications   Pneumococcal Vaccine   * Pneumococcal conjugate vaccine = PCV13 (Prevnar 13), PCV15 (Vaxneuvance), PCV20 (Prevnar 20)  * Pneumococcal polysaccharide vaccine = PPSV23 (Pneumovax) Adults 25-60 years old: 1-3 doses may be recommended based on certain risk factors  Adults 72 years old: 1-2 doses may be recommended based off what pneumonia vaccine you previously received   Hepatitis B Vaccine 3 dose series if at intermediate or high risk (ex: diabetes, end stage renal disease, liver disease)   Tetanus (Td) Vaccine - COST NOT COVERED BY MEDICARE PART B Following completion of primary series, a booster dose should be given every 10 years to maintain immunity against tetanus  Td may also be given as tetanus wound prophylaxis  Tdap Vaccine - COST NOT COVERED BY MEDICARE PART B Recommended at least once for all adults  For pregnant patients, recommended with each pregnancy  Shingles Vaccine (Shingrix) - COST NOT COVERED BY MEDICARE PART B  2 shot series recommended in those aged 48 and above     Health Maintenance Due:      Topic Date Due   • Colorectal Cancer Screening  Never done   • Breast Cancer Screening: Mammogram  11/15/2022   • Hepatitis C Screening  Completed     Immunizations Due:      Topic Date Due   • COVID-19 Vaccine (4 - Booster for Moderna series) 05/27/2022   • Influenza Vaccine (1) 09/01/2022   • Pneumococcal Vaccine: 65+ Years (3 - PPSV23 or PCV20) 10/04/2022     Advance Directives   What are advance directives? Advance directives are legal documents that state your wishes and plans for medical care  These plans are made ahead of time in case you lose your ability to make decisions for yourself  Advance directives can apply to any medical decision, such as the treatments you want, and if you want to donate organs  What are the types of advance directives?   There are many types of advance directives, and each state has rules about how to use them  You may choose a combination of any of the following:  · Living will: This is a written record of the treatment you want  You can also choose which treatments you do not want, which to limit, and which to stop at a certain time  This includes surgery, medicine, IV fluid, and tube feedings  · Durable power of  for healthcare [facility]): This is a written record that states who you want to make healthcare choices for you when you are unable to make them for yourself  This person, called a proxy, is usually a family member or a friend  You may choose more than 1 proxy  · Do not resuscitate (DNR) order:  A DNR order is used in case your heart stops beating or you stop breathing  It is a request not to have certain forms of treatment, such as CPR  A DNR order may be included in other types of advance directives  · Medical directive: This covers the care that you want if you are in a coma, near death, or unable to make decisions for yourself  You can list the treatments you want for each condition  Treatment may include pain medicine, surgery, blood transfusions, dialysis, IV or tube feedings, and a ventilator (breathing machine)  · Values history: This document has questions about your views, beliefs, and how you feel and think about life  This information can help others choose the care that you would choose  Why are advance directives important? An advance directive helps you control your care  Although spoken wishes may be used, it is better to have your wishes written down  Spoken wishes can be misunderstood, or not followed  Treatments may be given even if you do not want them  An advance directive may make it easier for your family to make difficult choices about your care     Weight Management   Why it is important to manage your weight:  Being overweight increases your risk of health conditions such as heart disease, high blood pressure, type 2 diabetes, and certain types of cancer  It can also increase your risk for osteoarthritis, sleep apnea, and other respiratory problems  Aim for a slow, steady weight loss  Even a small amount of weight loss can lower your risk of health problems  How to lose weight safely:  A safe and healthy way to lose weight is to eat fewer calories and get regular exercise  You can lose up about 1 pound a week by decreasing the number of calories you eat by 500 calories each day  Healthy meal plan for weight management:  A healthy meal plan includes a variety of foods, contains fewer calories, and helps you stay healthy  A healthy meal plan includes the following:  · Eat whole-grain foods more often  A healthy meal plan should contain fiber  Fiber is the part of grains, fruits, and vegetables that is not broken down by your body  Whole-grain foods are healthy and provide extra fiber in your diet  Some examples of whole-grain foods are whole-wheat breads and pastas, oatmeal, brown rice, and bulgur  · Eat a variety of vegetables every day  Include dark, leafy greens such as spinach, kale, chery greens, and mustard greens  Eat yellow and orange vegetables such as carrots, sweet potatoes, and winter squash  · Eat a variety of fruits every day  Choose fresh or canned fruit (canned in its own juice or light syrup) instead of juice  Fruit juice has very little or no fiber  · Eat low-fat dairy foods  Drink fat-free (skim) milk or 1% milk  Eat fat-free yogurt and low-fat cottage cheese  Try low-fat cheeses such as mozzarella and other reduced-fat cheeses  · Choose meat and other protein foods that are low in fat  Choose beans or other legumes such as split peas or lentils  Choose fish, skinless poultry (chicken or turkey), or lean cuts of red meat (beef or pork)  Before you cook meat or poultry, cut off any visible fat  · Use less fat and oil  Try baking foods instead of frying them   Add less fat, such as margarine, sour cream, regular salad dressing and mayonnaise to foods  Eat fewer high-fat foods  Some examples of high-fat foods include french fries, doughnuts, ice cream, and cakes  · Eat fewer sweets  Limit foods and drinks that are high in sugar  This includes candy, cookies, regular soda, and sweetened drinks  Exercise:  Exercise at least 30 minutes per day on most days of the week  Some examples of exercise include walking, biking, dancing, and swimming  You can also fit in more physical activity by taking the stairs instead of the elevator or parking farther away from stores  Ask your healthcare provider about the best exercise plan for you  © Copyright [employer] 2018 Information is for End User's use only and may not be sold, redistributed or otherwise used for commercial purposes   All illustrations and images included in CareNotes® are the copyrighted property of A D A M , Inc  or [address] [place] [address]

## 2022-10-30 PROBLEM — E08.65 DIABETES MELLITUS DUE TO UNDERLYING CONDITION, UNCONTROLLED, WITH HYPERGLYCEMIA (HCC): Status: RESOLVED | Noted: 2020-08-10 | Resolved: 2022-10-30

## 2022-10-30 PROBLEM — E11.65 CONTROLLED TYPE 2 DIABETES MELLITUS WITH HYPERGLYCEMIA, WITHOUT LONG-TERM CURRENT USE OF INSULIN (HCC): Status: ACTIVE | Noted: 2022-10-30

## 2022-10-31 NOTE — ASSESSMENT & PLAN NOTE
Uncontrolled since stopping Trulicity due to cost  Just started glipizide by endocrinology  Continue metformin  She is on lisinopril  Agrees to starting a statin at a low dose  Check lipids with nest set of labs  Lab Results   Component Value Date    HGBA1C 9 4 (H) 10/06/2022

## 2022-11-11 DIAGNOSIS — F32.5 MAJOR DEPRESSIVE DISORDER WITH SINGLE EPISODE, IN FULL REMISSION (HCC): ICD-10-CM

## 2022-11-11 RX ORDER — CITALOPRAM 20 MG/1
TABLET ORAL
Qty: 90 TABLET | Refills: 3 | Status: SHIPPED | OUTPATIENT
Start: 2022-11-11

## 2023-03-20 ENCOUNTER — OFFICE VISIT (OUTPATIENT)
Dept: SLEEP CENTER | Facility: CLINIC | Age: 68
End: 2023-03-20

## 2023-03-20 VITALS
WEIGHT: 235 LBS | DIASTOLIC BLOOD PRESSURE: 72 MMHG | BODY MASS INDEX: 36.88 KG/M2 | SYSTOLIC BLOOD PRESSURE: 126 MMHG | HEIGHT: 67 IN

## 2023-03-20 DIAGNOSIS — G47.33 OSA (OBSTRUCTIVE SLEEP APNEA): Primary | ICD-10-CM

## 2023-03-20 DIAGNOSIS — I10 HYPERTENSION, ESSENTIAL, BENIGN: ICD-10-CM

## 2023-03-20 NOTE — LETTER
March 20, 2023     Faheem Meneses MD  [address]  [address] [place] [address]    Patient: Noel Cope   YOB: 1955   Date of Visit: 3/20/2023       Dear Dr Ronak Boo: Thank you for referring Noel Cope to me for evaluation  Below are my notes for this consultation  If you have questions, please do not hesitate to call me  I look forward to following your patient along with you  Sincerely,        Moon Solorio DO        CC: No Recipients  Moon Solorio DO  3/20/2023  2:29 PM  Sign when Signing Visit  Progress Note - Sleep Medicine  Noel Cope 78 y o  female MRN: [id number]      Assessment/Plan  78 y o  F with PMHx of DM, multiple thyroid nodules, HTN, depression, allergic rhinitis who comes in for BLANCA follow up  1  Mild to moderate BLANCA (YODIT - 7 9 - AHI -22) who has good compliance and clinical response to autoCPAP 10-15  Residual AHI - 0 5      -  Continue autoCPAP 10-15 cc of H2O pressure      -  Rx written for supplies       -  Follow annually to evaluate compliance and renew suppleis        -  I also discussed in depth the risk of leaving sleep apnea untreated including hypertension, heart failure, arrhythmia, MI and stroke       ______________________________________________________________________    HPI:    Noel Cope presents today for follow-up for BLANCA  She states that she has been doing well with her CPAP  She denies any mask leak, pressure intolerance or headache  She feels more refreshed using her machine  She does admit that she did not use it for the past week as she was in [place]  However, normally she is consistent with her device  Review of Systems:  Review of Systems   Constitutional: Positive for fatigue  Negative for appetite change and unexpected weight change  HENT: Negative  Eyes: Negative  Respiratory: Negative  Cardiovascular: Negative  Gastrointestinal: Negative  Genitourinary: Negative  Musculoskeletal: Negative  Skin: Negative  Allergic/Immunologic: Negative  Neurological: Negative  Hematological: Negative  Psychiatric/Behavioral: Positive for sleep disturbance  Negative for behavioral problems and dysphoric mood  Social history updates:  Social History     Tobacco Use   Smoking Status Never   Smokeless Tobacco Never     Social History     Socioeconomic History   • Marital status: /Civil Union     Spouse name: Not on file   • Number of children: Not on file   • Years of education: Not on file   • Highest education level: Not on file   Occupational History   • Not on file   Tobacco Use   • Smoking status: Never   • Smokeless tobacco: Never   Vaping Use   • Vaping Use: Never used   Substance and Sexual Activity   • Alcohol use: Yes   • Drug use: Never   • Sexual activity: Not on file   Other Topics Concern   • Not on file   Social History Narrative   • Not on file     Social Determinants of Health     Financial Resource Strain: Low Risk    • Difficulty of Paying Living Expenses: Not hard at all   Food Insecurity: Not on file   Transportation Needs: No Transportation Needs   • Lack of Transportation (Medical): No   • Lack of Transportation (Non-Medical):  No   Physical Activity: Not on file   Stress: Not on file   Social Connections: Not on file   Intimate Partner Violence: Not on file   Housing Stability: Not on file       PhysicalExamination:  Vitals:   /72   Ht 5' 7" (1 702 m)   Wt 107 kg (235 lb)   BMI 36 81 kg/m²   General: Pleasant, Awake alert and oriented x 3, conversant without conversational dyspnea, NAD, normal affect  HEENT:  PERRL, Sclera noninjected, nonicteric OU, Nares patent,  no craniofacial abnormalities, Mucous membranes, moist, no oral lesions, normal dentition, Mallampati class 3  NECK: Trachea midline, no accessory muscle use, no stridor, no cervical or supraclavicular adenopathy, JVP not elevated  CARDIAC: Reg, single s1/S2, no m/r/g  PULM: CTA bilaterally no wheezing, rhonchi or rales  ABD: Normoactive bowel sounds, soft nontender, nondistended, no rebound, no rigidity, no guarding  EXT: No cyanosis, no clubbing, no edema, normal capillary refill  NEURO: no focal neurologic deficits, AAOx3, moving all extremities appropriately      Diagnostic Data:  Labs: I personally reviewed the most recent laboratory data pertinent to today's visit  I have personally reviewed pertinent lab results  Lab Results   Component Value Date    WBC 7 22 10/06/2022    HGB 13 2 10/06/2022    HCT 40 8 10/06/2022    MCV 93 10/06/2022     10/06/2022     Lab Results   Component Value Date    CALCIUM 9 4 10/06/2022    K 4 5 10/06/2022    CO2 26 10/06/2022     10/06/2022    BUN 20 10/06/2022    CREATININE 1 03 10/06/2022     No results found for: IGE  Lab Results   Component Value Date    ALT 33 10/06/2022    AST 16 10/06/2022    ALKPHOS 93 10/06/2022     Sleep studies:  PSG 2012: AHI 22, REM AHI 60, with an oxygen salome of 80%  Home study: 10/5/21  (YODIT) of 7 9  The lowest SpO2 recorded is 73%      Compliance Data:  2/14/23 - 3/15/23                                  Type of CPAP:  autoPAP 10-15, mean pressure 11 1, max pressure 14                                   Percent usage: 83%                                   Average time used: 8 hrs 11 mins                                   Residual AHI: 0 5                                         Nichelle Nieto DO

## 2023-03-20 NOTE — PROGRESS NOTES
Progress Note - Sleep Medicine  Yemi Enriquez 78 y o  female MRN: [id number]      Assessment/Plan  78 y o  F with PMHx of DM, multiple thyroid nodules, HTN, depression, allergic rhinitis who comes in for BLANCA follow up  1  Mild to moderate BLANCA (YODIT - 7 9 - AHI -22) who has good compliance and clinical response to autoCPAP 10-15  Residual AHI - 0 5      -  Continue autoCPAP 10-15 cc of H2O pressure      -  Rx written for supplies       -  Follow annually to evaluate compliance and renew suppleis        -  I also discussed in depth the risk of leaving sleep apnea untreated including hypertension, heart failure, arrhythmia, MI and stroke       ______________________________________________________________________    HPI:    Yemi Enriquez presents today for follow-up for BLANCA  She states that she has been doing well with her CPAP  She denies any mask leak, pressure intolerance or headache  She feels more refreshed using her machine  She does admit that she did not use it for the past week as she was in [place]  However, normally she is consistent with her device  Review of Systems:  Review of Systems   Constitutional: Positive for fatigue  Negative for appetite change and unexpected weight change  HENT: Negative  Eyes: Negative  Respiratory: Negative  Cardiovascular: Negative  Gastrointestinal: Negative  Genitourinary: Negative  Musculoskeletal: Negative  Skin: Negative  Allergic/Immunologic: Negative  Neurological: Negative  Hematological: Negative  Psychiatric/Behavioral: Positive for sleep disturbance  Negative for behavioral problems and dysphoric mood           Social history updates:  Social History     Tobacco Use   Smoking Status Never   Smokeless Tobacco Never     Social History     Socioeconomic History   • Marital status: /Civil Union     Spouse name: Not on file   • Number of children: Not on file   • Years of education: Not on file   • Highest education level: Not on file   Occupational History   • Not on file   Tobacco Use   • Smoking status: Never   • Smokeless tobacco: Never   Vaping Use   • Vaping Use: Never used   Substance and Sexual Activity   • Alcohol use: Yes   • Drug use: Never   • Sexual activity: Not on file   Other Topics Concern   • Not on file   Social History Narrative   • Not on file     Social Determinants of Health     Financial Resource Strain: Low Risk    • Difficulty of Paying Living Expenses: Not hard at all   Food Insecurity: Not on file   Transportation Needs: No Transportation Needs   • Lack of Transportation (Medical): No   • Lack of Transportation (Non-Medical): No   Physical Activity: Not on file   Stress: Not on file   Social Connections: Not on file   Intimate Partner Violence: Not on file   Housing Stability: Not on file       PhysicalExamination:  Vitals:   /72   Ht 5' 7" (1 702 m)   Wt 107 kg (235 lb)   BMI 36 81 kg/m²   General: Pleasant, Awake alert and oriented x 3, conversant without conversational dyspnea, NAD, normal affect  HEENT:  PERRL, Sclera noninjected, nonicteric OU, Nares patent,  no craniofacial abnormalities, Mucous membranes, moist, no oral lesions, normal dentition, Mallampati class 3  NECK: Trachea midline, no accessory muscle use, no stridor, no cervical or supraclavicular adenopathy, JVP not elevated  CARDIAC: Reg, single s1/S2, no m/r/g  PULM: CTA bilaterally no wheezing, rhonchi or rales  ABD: Normoactive bowel sounds, soft nontender, nondistended, no rebound, no rigidity, no guarding  EXT: No cyanosis, no clubbing, no edema, normal capillary refill  NEURO: no focal neurologic deficits, AAOx3, moving all extremities appropriately      Diagnostic Data:  Labs: I personally reviewed the most recent laboratory data pertinent to today's visit  I have personally reviewed pertinent lab results    Lab Results   Component Value Date    WBC 7 22 10/06/2022    HGB 13 2 10/06/2022    HCT 40 8 10/06/2022    MCV 93 10/06/2022     10/06/2022     Lab Results   Component Value Date    CALCIUM 9 4 10/06/2022    K 4 5 10/06/2022    CO2 26 10/06/2022     10/06/2022    BUN 20 10/06/2022    CREATININE 1 03 10/06/2022     No results found for: IGE  Lab Results   Component Value Date    ALT 33 10/06/2022    AST 16 10/06/2022    ALKPHOS 93 10/06/2022     Sleep studies:  PSG 2012: AHI 22, REM AHI 60, with an oxygen salome of 80%  Home study: 10/5/21  (YODIT) of 7 9  The lowest SpO2 recorded is 73%      Compliance Data:  2/14/23 - 3/15/23                                  Type of CPAP:  autoPAP 10-15, mean pressure 11 1, max pressure 14                                   Percent usage: 83%                                   Average time used: 8 hrs 11 mins                                   Residual AHI: 0 5                                         Suzanne Cardoza DO

## 2023-03-22 ENCOUNTER — TELEPHONE (OUTPATIENT)
Dept: SLEEP CENTER | Facility: CLINIC | Age: 68
End: 2023-03-22

## 2023-03-23 LAB

## 2023-04-23 ENCOUNTER — RA CDI HCC (OUTPATIENT)
Dept: OTHER | Facility: HOSPITAL | Age: 68
End: 2023-04-23

## 2023-04-23 NOTE — PROGRESS NOTES
[id number]  [facility] 75  coding opportunities          Chart Reviewed number of suggestions sent to Provider: 1     Patients Insurance     Medicare Insurance: Dae Neves

## 2023-05-01 ENCOUNTER — OFFICE VISIT (OUTPATIENT)
Dept: INTERNAL MEDICINE CLINIC | Facility: CLINIC | Age: 68
End: 2023-05-01

## 2023-05-01 VITALS
HEIGHT: 67 IN | DIASTOLIC BLOOD PRESSURE: 64 MMHG | WEIGHT: 230.2 LBS | BODY MASS INDEX: 36.13 KG/M2 | SYSTOLIC BLOOD PRESSURE: 112 MMHG | OXYGEN SATURATION: 98 % | HEART RATE: 74 BPM

## 2023-05-01 DIAGNOSIS — E11.65 TYPE 2 DIABETES MELLITUS WITH HYPERGLYCEMIA, WITHOUT LONG-TERM CURRENT USE OF INSULIN (HCC): Primary | ICD-10-CM

## 2023-05-01 DIAGNOSIS — Z23 ENCOUNTER FOR IMMUNIZATION: ICD-10-CM

## 2023-05-01 DIAGNOSIS — F32.5 MAJOR DEPRESSIVE DISORDER WITH SINGLE EPISODE, IN FULL REMISSION (HCC): ICD-10-CM

## 2023-05-01 DIAGNOSIS — E66.01 CLASS 2 SEVERE OBESITY WITH SERIOUS COMORBIDITY AND BODY MASS INDEX (BMI) OF 36.0 TO 36.9 IN ADULT, UNSPECIFIED OBESITY TYPE (HCC): ICD-10-CM

## 2023-05-01 LAB — SL AMB POCT HEMOGLOBIN AIC: 7.2 (ref ?–6.5)

## 2023-05-01 NOTE — PROGRESS NOTES
Assessment/Plan:    Type 2 diabetes mellitus with hyperglycemia, without long-term current use of insulin ([facility])    Lab Results   Component Value Date    HGBA1C 7 2 (A) 05/01/2023   Discuss GLP 1 agonist with endocrinology  She was given Ozempic samples and did well on it  Continue metformin glipizide  She is on a statin and ACE inh    Class 2 severe obesity with serious comorbidity and body mass index (BMI) of 36 0 to 36 9 in adult, unspecified obesity type ([facility] )  BMI Counseling: Body mass index is 36 05 kg/m²  The BMI is above normal  Nutrition recommendations include decreasing overall calorie intake  Major depressive disorder with single episode, in full remission ([facility] )  Stable on citaloprm    Advised to check if her Gyn can tell her who performed her colonoscopy last time so I can request the report     Problem List Items Addressed This Visit        Endocrine    Type 2 diabetes mellitus with hyperglycemia, without long-term current use of insulin ([place] [address] ) - Primary       Lab Results   Component Value Date    HGBA1C 7 2 (A) 05/01/2023   Discuss GLP 1 agonist with endocrinology  She was given Ozempic samples and did well on it  Continue metformin glipizide  She is on a statin and ACE inh         Relevant Orders    POCT hemoglobin A1c (Completed)       Other    Major depressive disorder with single episode, in full remission ([place] [address] )     Stable on citaloprm         Class 2 severe obesity with serious comorbidity and body mass index (BMI) of 36 0 to 36 9 in adult, unspecified obesity type ([facility] )     BMI Counseling: Body mass index is 36 05 kg/m²  The BMI is above normal  Nutrition recommendations include decreasing overall calorie intake  Other Visit Diagnoses     Encounter for immunization        Relevant Orders    Pneumococcal Conjugate Vaccine 20-valent (PCV20) (Completed)            Subjective:      Patient ID: Mor Otero is a 78 y o  female      HPI  Here for a follow up  Feeling well overall  Just "finished Ozempic samples  She is seeing endocrinology soon    The following portions of the patient's history were reviewed and updated as appropriate: allergies, current medications, past family history, past medical history, past social history, past surgical history and problem list     Review of Systems   Constitutional: Negative for fever and unexpected weight change  Respiratory: Negative for shortness of breath  Cardiovascular: Negative for chest pain and palpitations  Gastrointestinal: Negative for abdominal pain, constipation and diarrhea  Genitourinary: Negative for difficulty urinating  Neurological: Negative for dizziness and headaches  Objective:      /64 (BP Location: Left arm, Patient Position: Sitting, Cuff Size: Large)   Pulse 74   Ht 5' 7\" (1 702 m)   Wt 104 kg (230 lb 3 2 oz)   SpO2 98%   BMI 36 05 kg/m²          Physical Exam  Constitutional:       Appearance: She is well-developed  She is obese  Eyes:      Conjunctiva/sclera: Conjunctivae normal    Cardiovascular:      Rate and Rhythm: Normal rate and regular rhythm  Heart sounds: Normal heart sounds  No murmur heard  Pulmonary:      Effort: Pulmonary effort is normal  No respiratory distress  Breath sounds: Normal breath sounds  No wheezing or rales  Abdominal:      General: There is no distension  Palpations: Abdomen is soft  There is no mass  Tenderness: There is no abdominal tenderness  There is no guarding or rebound  Musculoskeletal:      Cervical back: Neck supple  Right lower leg: No edema  Left lower leg: No edema  Neurological:      Mental Status: She is alert and oriented to person, place, and time  Psychiatric:         Behavior: Behavior normal          Thought Content:  Thought content normal          Judgment: Judgment normal          "

## 2023-05-01 NOTE — ASSESSMENT & PLAN NOTE
BMI Counseling: Body mass index is 36 05 kg/m²  The BMI is above normal  Nutrition recommendations include decreasing overall calorie intake

## 2023-05-01 NOTE — ASSESSMENT & PLAN NOTE
Lab Results   Component Value Date    HGBA1C 7 2 (A) 05/01/2023   Discuss GLP 1 agonist with endocrinology  She was given Ozempic samples and did well on it  Continue metformin glipizide  She is on a statin and ACE inh

## 2023-06-26 DIAGNOSIS — I10 HYPERTENSION, ESSENTIAL, BENIGN: ICD-10-CM

## 2023-06-26 RX ORDER — LISINOPRIL 10 MG/1
TABLET ORAL
Qty: 90 TABLET | Refills: 3 | Status: SHIPPED | OUTPATIENT
Start: 2023-06-26

## 2023-11-02 ENCOUNTER — VBI (OUTPATIENT)
Dept: ADMINISTRATIVE | Facility: OTHER | Age: 68
End: 2023-11-02

## 2023-11-06 DIAGNOSIS — F32.5 MAJOR DEPRESSIVE DISORDER WITH SINGLE EPISODE, IN FULL REMISSION (HCC): ICD-10-CM

## 2023-11-06 RX ORDER — CITALOPRAM 20 MG/1
TABLET ORAL
Qty: 90 TABLET | Refills: 0 | Status: SHIPPED | OUTPATIENT
Start: 2023-11-06

## 2023-11-13 ENCOUNTER — OFFICE VISIT (OUTPATIENT)
Dept: INTERNAL MEDICINE CLINIC | Facility: CLINIC | Age: 68
End: 2023-11-13
Payer: COMMERCIAL

## 2023-11-13 VITALS
SYSTOLIC BLOOD PRESSURE: 138 MMHG | HEIGHT: 66 IN | BODY MASS INDEX: 36.42 KG/M2 | OXYGEN SATURATION: 96 % | HEART RATE: 83 BPM | WEIGHT: 226.6 LBS | DIASTOLIC BLOOD PRESSURE: 82 MMHG

## 2023-11-13 DIAGNOSIS — H92.01 CHRONIC RIGHT EAR PAIN: ICD-10-CM

## 2023-11-13 DIAGNOSIS — E11.65 TYPE 2 DIABETES MELLITUS WITH HYPERGLYCEMIA, WITHOUT LONG-TERM CURRENT USE OF INSULIN (HCC): ICD-10-CM

## 2023-11-13 DIAGNOSIS — I87.2 STASIS DERMATITIS OF BOTH LEGS: ICD-10-CM

## 2023-11-13 DIAGNOSIS — G89.29 CHRONIC RIGHT EAR PAIN: ICD-10-CM

## 2023-11-13 DIAGNOSIS — Z23 ENCOUNTER FOR IMMUNIZATION: ICD-10-CM

## 2023-11-13 DIAGNOSIS — Z00.00 MEDICARE ANNUAL WELLNESS VISIT, INITIAL: Primary | ICD-10-CM

## 2023-11-13 DIAGNOSIS — Z12.31 BREAST CANCER SCREENING BY MAMMOGRAM: ICD-10-CM

## 2023-11-13 LAB
CREAT UR-MCNC: 103.1 MG/DL
MICROALBUMIN UR-MCNC: 10.7 MG/L
MICROALBUMIN/CREAT 24H UR: 10 MG/G CREATININE (ref 0–30)

## 2023-11-13 PROCEDURE — 83036 HEMOGLOBIN GLYCOSYLATED A1C: CPT | Performed by: INTERNAL MEDICINE

## 2023-11-13 PROCEDURE — G0008 ADMIN INFLUENZA VIRUS VAC: HCPCS

## 2023-11-13 PROCEDURE — G0438 PPPS, INITIAL VISIT: HCPCS | Performed by: INTERNAL MEDICINE

## 2023-11-13 PROCEDURE — 82043 UR ALBUMIN QUANTITATIVE: CPT | Performed by: INTERNAL MEDICINE

## 2023-11-13 PROCEDURE — 90662 IIV NO PRSV INCREASED AG IM: CPT

## 2023-11-13 PROCEDURE — 99214 OFFICE O/P EST MOD 30 MIN: CPT | Performed by: INTERNAL MEDICINE

## 2023-11-13 PROCEDURE — 82570 ASSAY OF URINE CREATININE: CPT | Performed by: INTERNAL MEDICINE

## 2023-11-13 RX ORDER — TRIAMCINOLONE ACETONIDE 1 MG/G
CREAM TOPICAL 2 TIMES DAILY
Qty: 80 G | Refills: 0 | Status: SHIPPED | OUTPATIENT
Start: 2023-11-13

## 2023-11-13 NOTE — PATIENT INSTRUCTIONS
Medicare Preventive Visit Patient Instructions  Thank you for completing your Welcome to Medicare Visit or Medicare Annual Wellness Visit today. Your next wellness visit will be due in one year (11/13/2024). The screening/preventive services that you may require over the next 5-10 years are detailed below. Some tests may not apply to you based off risk factors and/or age. Screening tests ordered at today's visit but not completed yet may show as past due. Also, please note that scanned in results may not display below. Preventive Screenings:  Service Recommendations Previous Testing/Comments   Colorectal Cancer Screening  * Colonoscopy    * Fecal Occult Blood Test (FOBT)/Fecal Immunochemical Test (FIT)  * Fecal DNA/Cologuard Test  * Flexible Sigmoidoscopy Age: 43-73 years old   Colonoscopy: every 10 years (may be performed more frequently if at higher risk)  OR  FOBT/FIT: every 1 year  OR  Cologuard: every 3 years  OR  Sigmoidoscopy: every 5 years  Screening may be recommended earlier than age 39 if at higher risk for colorectal cancer. Also, an individualized decision between you and your healthcare provider will decide whether screening between the ages of 77-80 would be appropriate. Colonoscopy: 01/11/2016  FOBT/FIT: Not on file  Cologuard: Not on file  Sigmoidoscopy: Not on file          Breast Cancer Screening Age: 36 years old  Frequency: every 1-2 years  Not required if history of left and right mastectomy Mammogram: 11/21/2022        Cervical Cancer Screening Between the ages of 21-29, pap smear recommended once every 3 years. Between the ages of 32-69, can perform pap smear with HPV co-testing every 5 years.    Recommendations may differ for women with a history of total hysterectomy, cervical cancer, or abnormal pap smears in past. Pap Smear: Not on file        Hepatitis C Screening Once for adults born between [address] [place] [address]  More frequently in patients at high risk for Hepatitis C Hep C Antibody: 04/06/2018        Diabetes Screening 1-2 times per year if you're at risk for diabetes or have pre-diabetes Fasting glucose: 217 mg/dL (10/6/2022)  A1C: 7.2 (5/1/2023)      Cholesterol Screening Once every 5 years if you don't have a lipid disorder. May order more often based on risk factors. Lipid panel: 10/06/2022          Other Preventive Screenings Covered by Medicare:  Abdominal Aortic Aneurysm (AAA) Screening: covered once if your at risk. You're considered to be at risk if you have a family history of AAA. Lung Cancer Screening: covers low dose CT scan once per year if you meet all of the following conditions: (1) Age 48-67; (2) No signs or symptoms of lung cancer; (3) Current smoker or have quit smoking within the last 15 years; (4) You have a tobacco smoking history of at least 20 pack years (packs per day multiplied by number of years you smoked); (5) You get a written order from a healthcare provider. Glaucoma Screening: covered annually if you're considered high risk: (1) You have diabetes OR (2) Family history of glaucoma OR (3)  aged 48 and older OR (3)  American aged 72 and older  Osteoporosis Screening: covered every 2 years if you meet one of the following conditions: (1) You're estrogen deficient and at risk for osteoporosis based off medical history and other findings; (2) Have a vertebral abnormality; (3) On glucocorticoid therapy for more than 3 months; (4) Have primary hyperparathyroidism; (5) On osteoporosis medications and need to assess response to drug therapy. Last bone density test (DXA Scan): 03/21/2022. HIV Screening: covered annually if you're between the age of 14-79. Also covered annually if you are younger than 13 and older than 72 with risk factors for HIV infection. For pregnant patients, it is covered up to 3 times per pregnancy.     Immunizations:  Immunization Recommendations   Influenza Vaccine Annual influenza vaccination during flu season is recommended for all persons aged >= 6 months who do not have contraindications   Pneumococcal Vaccine   * Pneumococcal conjugate vaccine = PCV13 (Prevnar 13), PCV15 (Vaxneuvance), PCV20 (Prevnar 20)  * Pneumococcal polysaccharide vaccine = PPSV23 (Pneumovax) Adults 32-68 yo with certain risk factors or if 69+ yo  If never received any pneumonia vaccine: recommend Prevnar 20 (PCV20)  Give PCV20 if previously received 1 dose of PCV13 or PPSV23   Hepatitis B Vaccine 3 dose series if at intermediate or high risk (ex: diabetes, end stage renal disease, liver disease)   Respiratory syncytial virus (RSV) Vaccine - COVERED BY MEDICARE PART D  * RSVPreF3 (Arexvy) CDC recommends that adults 61years of age and older may receive a single dose of RSV vaccine using shared clinical decision-making (SCDM)   Tetanus (Td) Vaccine - COST NOT COVERED BY MEDICARE PART B Following completion of primary series, a booster dose should be given every 10 years to maintain immunity against tetanus. Td may also be given as tetanus wound prophylaxis. Tdap Vaccine - COST NOT COVERED BY MEDICARE PART B Recommended at least once for all adults. For pregnant patients, recommended with each pregnancy. Shingles Vaccine (Shingrix) - COST NOT COVERED BY MEDICARE PART B  2 shot series recommended in those 19 years and older who have or will have weakened immune systems or those 50 years and older     Health Maintenance Due:      Topic Date Due   • Colorectal Cancer Screening  Never done   • Breast Cancer Screening: Mammogram  11/21/2023   • Hepatitis C Screening  Completed     Immunizations Due:      Topic Date Due   • COVID-19 Vaccine (4 - Moderna series) 03/24/2022   • Influenza Vaccine (1) 09/01/2023     Advance Directives   What are advance directives? Advance directives are legal documents that state your wishes and plans for medical care. These plans are made ahead of time in case you lose your ability to make decisions for yourself.  Advance directives can apply to any medical decision, such as the treatments you want, and if you want to donate organs. What are the types of advance directives? There are many types of advance directives, and each state has rules about how to use them. You may choose a combination of any of the following:  Living will: This is a written record of the treatment you want. You can also choose which treatments you do not want, which to limit, and which to stop at a certain time. This includes surgery, medicine, IV fluid, and tube feedings. Durable power of  for [facility]): This is a written record that states who you want to make healthcare choices for you when you are unable to make them for yourself. This person, called a proxy, is usually a family member or a friend. You may choose more than 1 proxy. Do not resuscitate (DNR) order:  A DNR order is used in case your heart stops beating or you stop breathing. It is a request not to have certain forms of treatment, such as CPR. A DNR order may be included in other types of advance directives. Medical directive: This covers the care that you want if you are in a coma, near death, or unable to make decisions for yourself. You can list the treatments you want for each condition. Treatment may include pain medicine, surgery, blood transfusions, dialysis, IV or tube feedings, and a ventilator (breathing machine). Values history: This document has questions about your views, beliefs, and how you feel and think about life. This information can help others choose the care that you would choose. Why are advance directives important? An advance directive helps you control your care. Although spoken wishes may be used, it is better to have your wishes written down. Spoken wishes can be misunderstood, or not followed. Treatments may be given even if you do not want them.  An advance directive may make it easier for your family to make difficult choices about your care. Weight Management   Why it is important to manage your weight:  Being overweight increases your risk of health conditions such as heart disease, high blood pressure, type 2 diabetes, and certain types of cancer. It can also increase your risk for osteoarthritis, sleep apnea, and other respiratory problems. Aim for a slow, steady weight loss. Even a small amount of weight loss can lower your risk of health problems. How to lose weight safely:  A safe and healthy way to lose weight is to eat fewer calories and get regular exercise. You can lose up about 1 pound a week by decreasing the number of calories you eat by 500 calories each day. Healthy meal plan for weight management:  A healthy meal plan includes a variety of foods, contains fewer calories, and helps you stay healthy. A healthy meal plan includes the following:  Eat whole-grain foods more often. A healthy meal plan should contain fiber. Fiber is the part of grains, fruits, and vegetables that is not broken down by your body. Whole-grain foods are healthy and provide extra fiber in your diet. Some examples of whole-grain foods are whole-wheat breads and pastas, oatmeal, brown rice, and bulgur. Eat a variety of vegetables every day. Include dark, leafy greens such as spinach, kale, chery greens, and mustard greens. Eat yellow and orange vegetables such as carrots, sweet potatoes, and winter squash. Eat a variety of fruits every day. Choose fresh or canned fruit (canned in its own juice or light syrup) instead of juice. Fruit juice has very little or no fiber. Eat low-fat dairy foods. Drink fat-free (skim) milk or 1% milk. Eat fat-free yogurt and low-fat cottage cheese. Try low-fat cheeses such as mozzarella and other reduced-fat cheeses. Choose meat and other protein foods that are low in fat. Choose beans or other legumes such as split peas or lentils.  Choose fish, skinless poultry (chicken or turkey), or lean cuts of red meat (beef or pork). Before you cook meat or poultry, cut off any visible fat. Use less fat and oil. Try baking foods instead of frying them. Add less fat, such as margarine, sour cream, regular salad dressing and mayonnaise to foods. Eat fewer high-fat foods. Some examples of high-fat foods include french fries, doughnuts, ice cream, and cakes. Eat fewer sweets. Limit foods and drinks that are high in sugar. This includes candy, cookies, regular soda, and sweetened drinks. Exercise:  Exercise at least 30 minutes per day on most days of the week. Some examples of exercise include walking, biking, dancing, and swimming. You can also fit in more physical activity by taking the stairs instead of the elevator or parking farther away from stores. Ask your healthcare provider about the best exercise plan for you. © Copyright [address] 2018 Information is for End User's use only and may not be sold, redistributed or otherwise used for commercial purposes.  All illustrations and images included in CareNotes® are the copyrighted property of [employer] or [address] [place] [address]

## 2023-11-13 NOTE — PROGRESS NOTES
Assessment and Plan:     Problem List Items Addressed This Visit        Endocrine    Type 2 diabetes mellitus with hyperglycemia, without long-term current use of insulin (HCC)    Relevant Orders    POCT hemoglobin A1c (Completed)    Albumin / creatinine urine ratio (Completed)   Other Visit Diagnoses     Medicare annual wellness visit, initial    -  Primary    Breast cancer screening by mammogram        Relevant Orders    Mammo screening bilateral w 3d & cad    Encounter for immunization        Relevant Orders    influenza vaccine, high-dose, PF 0.7 mL (FLUZONE HIGH-DOSE) (Completed)    Stasis dermatitis of both legs        Relevant Medications    triamcinolone (KENALOG) 0.1 % cream    Chronic right ear pain        Relevant Orders    Ambulatory Referral to Otolaryngology        Colonoscopy in 2016 but unsure who and where it was performed so report cannot be obtained     Preventive health issues were discussed with patient, and age appropriate screening tests were ordered as noted in patient's After Visit Summary. Personalized health advice and appropriate referrals for health education or preventive services given if needed, as noted in patient's After Visit Summary. History of Present Illness:     Patient presents for a Medicare Wellness Visit    HPI   Patient Care Team:  Jett Rosales MD as PCP - General (Internal Medicine)     Review of Systems:     Review of Systems   Constitutional:  Negative for unexpected weight change. HENT:  Positive for ear pain (right). Negative for congestion. Eyes:  Positive for photophobia, discharge, redness and itching. Negative for visual disturbance. Respiratory:  Negative for shortness of breath. Cardiovascular:  Negative for chest pain and palpitations. Gastrointestinal:  Negative for abdominal pain, constipation and diarrhea. Neurological:  Negative for dizziness and headaches.         Problem List:     Patient Active Problem List   Diagnosis   • Hypertension, essential, benign   • Raynaud's disease without gangrene   • Obesity (BMI 30.0-34. 9)   • BLANCA (obstructive sleep apnea)   • Multiple thyroid nodules   • Major depressive disorder with single episode, in full remission (HCC)   • Non-seasonal allergic rhinitis   • Class 2 severe obesity with serious comorbidity and body mass index (BMI) of 36.0 to 36.9 in adult, unspecified obesity type    • Type 2 diabetes mellitus with hyperglycemia, without long-term current use of insulin (HCC)      Past Medical and Surgical History:     Past Medical History:   Diagnosis Date   • Complex endometrial hyperplasia    • Diabetes mellitus ([address])    • Diabetes mellitus due to underlying condition, uncontrolled, with hyperglycemia ([address]) 8/10/2020   • Hypertension    • Pneumonia      Past Surgical History:   Procedure Laterality Date   • HYSTERECTOMY  03/2017    Dr Ngozi Bryant   • TOTAL ABDOMINAL HYSTERECTOMY W/ BILATERAL SALPINGOOPHORECTOMY        Family History:     Family History   Problem Relation Age of Onset   • Diabetes Father    • Hypertension Father    • Prostate cancer Father    • Breast cancer Mother    • Hyperlipidemia Mother    • Bone cancer Mother    • Breast cancer Maternal Grandmother    • Liver cancer Paternal Grandfather       Social History:     Social History     Socioeconomic History   • Marital status: /Civil Union     Spouse name: None   • Number of children: None   • Years of education: None   • Highest education level: None   Occupational History   • None   Tobacco Use   • Smoking status: Never   • Smokeless tobacco: Never   Vaping Use   • Vaping Use: Never used   Substance and Sexual Activity   • Alcohol use: Yes     Comment: 2 a month   • Drug use: Never   • Sexual activity: None   Other Topics Concern   • None   Social History Narrative   • None     Social Determinants of Health     Financial Resource Strain: Low Risk  (11/13/2023)    Overall Financial Resource Strain (CARDIA)    • Difficulty of Paying Living Expenses: Not hard at all   Food Insecurity: Not on file   Transportation Needs: No Transportation Needs (11/13/2023)    PRAPARE - Transportation    • Lack of Transportation (Medical): No    • Lack of Transportation (Non-Medical): No   Physical Activity: Not on file   Stress: Not on file   Social Connections: Not on file   Intimate Partner Violence: Not on file   Housing Stability: Not on file      Medications and Allergies:     Current Outpatient Medications   Medication Sig Dispense Refill   • albuterol (PROVENTIL HFA,VENTOLIN HFA) 90 mcg/act inhaler Inhale 2 puffs every 4 (four) hours as needed     • Aspirin Buf,CaCarb-MgCarb-MgO, 81 MG TABS Take 1 tablet by mouth daily     • atorvastatin (LIPITOR) 10 mg tablet TAKE 1 TABLET DAILY 90 tablet 3   • Cholecalciferol 50 MCG (2000 UT) TABS Take by mouth daily      • citalopram (CeleXA) 20 mg tablet TAKE 1 TABLET DAILY 90 tablet 0   • fluticasone (Flonase) 50 mcg/act nasal spray 2 sprays into each nostril daily 3 Bottle 2   • glipiZIDE (GLUCOTROL) 5 mg tablet Take 5 mg by mouth 2 (two) times a day     • Krill Oil 1000 MG CAPS Take by mouth in the morning     • lisinopril (ZESTRIL) 10 mg tablet TAKE 1 TABLET DAILY 90 tablet 3   • magnesium 30 MG tablet Take 200 mg by mouth 3 (three) times a week      • metFORMIN (GLUCOPHAGE) 500 mg tablet Take 4 tablets by mouth in the morning (1in am and 3 at night)     • multivitamin (THERAGRAN) TABS Take 1 tablet by mouth daily     • OneTouch Ultra test strip      • triamcinolone (KENALOG) 0.1 % cream Apply topically 2 (two) times a day 80 g 0   • Turmeric (QC TUMERIC COMPLEX PO) Take 2,000 Int'l Units/L by mouth     • vitamin B-12 (VITAMIN B-12) 500 mcg tablet Take 1 tablet by mouth daily       No current facility-administered medications for this visit.      Allergies   Allergen Reactions   • Amoxicillin Nausea Only   • Sulfamethoxazole-Trimethoprim      rash   • Tramadol Nausea Only     Other reaction(s): Nausea and/or vomiting   • Omeprazole Rash   • Penicillins Itching and Rash     Reaction Date: 20Dec2011;   tolerates ceftriaxone        Immunizations:     Immunization History   Administered Date(s) Administered   • COVID-19 MODERNA VACC 0.5 ML IM 04/30/2021, 05/28/2021   • COVID-19 PFIZER VACCINE 0.3 ML IM 01/27/2022   • INFLUENZA 02/17/2014, 10/27/2014, 09/21/2015, 01/30/2017, 12/10/2018, 01/20/2020, 10/15/2021, 10/28/2022   • Influenza, high dose seasonal 0.7 mL 10/04/2021, 10/28/2022, 11/13/2023   • Influenza, seasonal, injectable 11/30/2012   • Pneumococcal Conjugate 13-Valent 10/04/2021   • Pneumococcal Conjugate Vaccine 20-valent (Pcv20), Polysace 05/01/2023   • Pneumococcal Polysaccharide PPV23 07/14/2013, 07/16/2013   • Td (adult), Unspecified 06/22/2014   • Tdap 09/26/2016   • Zoster Vaccine Recombinant 07/01/2019, 02/06/2020      Health Maintenance:         Topic Date Due   • Colorectal Cancer Screening  Never done   • Breast Cancer Screening: Mammogram  11/21/2023   • Hepatitis C Screening  Completed         Topic Date Due   • COVID-19 Vaccine (4 - [place] series) 03/24/2022      Medicare Screening Tests and Risk Assessments:     Griselda Sprinkles is here for her Initial Wellness visit. Health Risk Assessment:   Patient rates overall health as fair. Patient feels that their physical health rating is slightly worse. Patient is very satisfied with their life. Eyesight was rated as slightly worse. Hearing was rated as slightly worse. Patient feels that their emotional and mental health rating is same. Patients states they are never, rarely angry. Patient states they are sometimes unusually tired/fatigued. Pain experienced in the last 7 days has been some. Patient's pain rating has been 2/10. Patient states that she has experienced no weight loss or gain in last 6 months. Depression Screening:   PHQ-9 Score: 0      Fall Risk Screening:    In the past year, patient has experienced: no history of falling in past year      Urinary Incontinence Screening:   Patient has not leaked urine accidently in the last six months. Home Safety:  Patient does not have trouble with stairs inside or outside of their home. Patient has working smoke alarms and has working carbon monoxide detector. Home safety hazards include: none. Nutrition:   Current diet is Low Carb. Medications:   Patient is currently taking over-the-counter supplements. OTC medications include: see medication list. Patient is able to manage medications. Activities of Daily Living (ADLs)/Instrumental Activities of Daily Living (IADLs):   Walk and transfer into and out of bed and chair?: Yes  Dress and groom yourself?: Yes    Bathe or shower yourself?: Yes    Feed yourself?  Yes  Do your laundry/housekeeping?: Yes  Manage your money, pay your bills and track your expenses?: Yes  Make your own meals?: Yes    Do your own shopping?: Yes    Previous Hospitalizations:   Any hospitalizations or ED visits within the last 12 months?: No      Advance Care Planning:   Living will: No    Durable POA for healthcare: No    Advanced directive: No    Advanced directive counseling given: Yes    ACP document given: Yes    Patient declined ACP directive: No      Cognitive Screening:   Provider or family/friend/caregiver concerned regarding cognition?: No    PREVENTIVE SCREENINGS      Cardiovascular Screening:    General: Screening Current      Diabetes Screening:     General: Screening Not Indicated and History Diabetes      Colorectal Cancer Screening:     General: Screening Current      Breast Cancer Screening:     General: Screening Current      Cervical Cancer Screening:    General: Screening Not Indicated      Osteoporosis Screening:    General: Screening Current      Abdominal Aortic Aneurysm (AAA) Screening:        General: Screening Not Indicated      Lung Cancer Screening:     General: Screening Not Indicated      Hepatitis C Screening:    General: Screening Current    Screening, Brief Intervention, and Referral to Treatment (SBIRT)    Screening  Typical number of drinks in a day: 0  Typical number of drinks in a week: 0  Interpretation: Low risk drinking behavior. Single Item Drug Screening:  How often have you used an illegal drug (including marijuana) or a prescription medication for non-medical reasons in the past year? never    Single Item Drug Screen Score: 0  Interpretation: Negative screen for possible drug use disorder  Diabetic Foot Exam    Patient's shoes and socks removed. Right Foot/Ankle   Right Foot Inspection  Skin Exam: dry skin, callus and callus. Toe Exam: ROM and strength within normal limits. Sensory   Vibration: intact  Monofilament testing: intact    Vascular  The right DP pulse is 2+. The right PT pulse is 2+. Left Foot/Ankle  Left Foot Inspection  Skin Exam: dry skin and callus. Toe Exam: ROM and strength within normal limits. Sensory   Vibration: intact  Monofilament testing: intact    Vascular  The left DP pulse is 1+. The left PT pulse is 1+. Assign Risk Category  Deformity present  No loss of protective sensation  Weak pulses  Risk: 2    No results found. Physical Exam:     /82   Pulse 83   Ht 5' 6.3" (1.684 m)   Wt 103 kg (226 lb 9.6 oz)   SpO2 96%   BMI 36.25 kg/m²     Physical Exam  Constitutional:       General: She is not in acute distress. Appearance: She is well-developed. She is not ill-appearing, toxic-appearing or diaphoretic. HENT:      Right Ear: External ear normal. There is no impacted cerumen. Left Ear: External ear normal. There is no impacted cerumen. Eyes:      Conjunctiva/sclera: Conjunctivae normal.      Pupils: Pupils are equal, round, and reactive to light. Cardiovascular:      Rate and Rhythm: Normal rate and regular rhythm. Pulses: Pulses are weak. Dorsalis pedis pulses are 2+ on the right side and 1+ on the left side.         Posterior tibial pulses are 2+ on the right side and 1+ on the left side. Heart sounds: Normal heart sounds. No murmur heard. Pulmonary:      Effort: Pulmonary effort is normal. No respiratory distress. Breath sounds: Normal breath sounds. No stridor. No wheezing or rales. Abdominal:      General: There is no distension. Palpations: Abdomen is soft. There is no mass. Tenderness: There is no abdominal tenderness. There is no guarding or rebound. Musculoskeletal:      Cervical back: Neck supple. Right lower leg: No edema. Left lower leg: No edema. Feet:      Right foot:      Skin integrity: Callus and dry skin present. Left foot:      Skin integrity: Callus and dry skin present. Skin:     Findings: Erythema (pretibial) present. Neurological:      Mental Status: She is alert and oriented to person, place, and time. Psychiatric:         Mood and Affect: Mood normal.         Behavior: Behavior normal.         Thought Content:  Thought content normal.         Judgment: Judgment normal.          Dennis Hogue MD

## 2023-11-27 LAB — SL AMB POCT HEMOGLOBIN AIC: 7.6 (ref ?–6.5)

## 2024-01-12 ENCOUNTER — HOSPITAL ENCOUNTER (OUTPATIENT)
Dept: RADIOLOGY | Facility: MEDICAL CENTER | Age: 69
Discharge: HOME | End: 2024-01-12
Payer: COMMERCIAL

## 2024-01-12 VITALS — HEIGHT: 66 IN | BODY MASS INDEX: 36 KG/M2 | WEIGHT: 224 LBS

## 2024-01-12 DIAGNOSIS — Z12.31 ENCOUNTER FOR SCREENING MAMMOGRAM FOR MALIGNANT NEOPLASM OF BREAST: ICD-10-CM

## 2024-01-12 DIAGNOSIS — Z12.31 BREAST CANCER SCREENING BY MAMMOGRAM: ICD-10-CM

## 2024-01-12 PROCEDURE — 77067 SCR MAMMO BI INCL CAD: CPT

## 2024-01-12 PROCEDURE — 77063 BREAST TOMOSYNTHESIS BI: CPT

## 2024-02-02 DIAGNOSIS — F32.5 MAJOR DEPRESSIVE DISORDER WITH SINGLE EPISODE, IN FULL REMISSION (HCC): ICD-10-CM

## 2024-02-02 RX ORDER — CITALOPRAM 20 MG/1
TABLET ORAL
Qty: 90 TABLET | Refills: 1 | Status: SHIPPED | OUTPATIENT
Start: 2024-02-02

## 2024-03-04 LAB
LEFT EYE DIABETIC RETINOPATHY: NORMAL
RIGHT EYE DIABETIC RETINOPATHY: NORMAL

## 2024-04-01 ENCOUNTER — TELEPHONE (OUTPATIENT)
Age: 69
End: 2024-04-01

## 2024-04-01 ENCOUNTER — OFFICE VISIT (OUTPATIENT)
Dept: SLEEP CENTER | Facility: CLINIC | Age: 69
End: 2024-04-01
Payer: COMMERCIAL

## 2024-04-01 VITALS
BODY MASS INDEX: 36.77 KG/M2 | HEART RATE: 89 BPM | WEIGHT: 228.8 LBS | HEIGHT: 66 IN | OXYGEN SATURATION: 97 % | SYSTOLIC BLOOD PRESSURE: 130 MMHG | DIASTOLIC BLOOD PRESSURE: 80 MMHG

## 2024-04-01 DIAGNOSIS — G47.33 OSA (OBSTRUCTIVE SLEEP APNEA): Primary | ICD-10-CM

## 2024-04-01 DIAGNOSIS — E11.65 TYPE 2 DIABETES MELLITUS WITH HYPERGLYCEMIA, WITHOUT LONG-TERM CURRENT USE OF INSULIN (HCC): Primary | ICD-10-CM

## 2024-04-01 PROCEDURE — 99213 OFFICE O/P EST LOW 20 MIN: CPT | Performed by: INTERNAL MEDICINE

## 2024-04-01 NOTE — TELEPHONE ENCOUNTER
PT requesting an ambulatory referral to be put in. She is currently going to a  endocrinologist, but she wishes to switch over to [facility]. She was informed of  Dr Sony Madrid, but just needs a referral put in.    Please advise    Thank You

## 2024-04-01 NOTE — PROGRESS NOTES
Progress Note - Sleep Medicine  Patito Driscoll 68 y.o. female MRN: [id number]       Impression & Plan:   68 y.o. F with PMHx of DM, multiple thyroid nodules, HTN, depression, allergic rhinitis who comes in for BLANCA follow up.  1.  Mild to moderate BLANCA (YODIT - 7.9 - AHI -22) who has good compliance and clinical response to autoCPAP 10-15.  Residual AHI - 0.5      -  Continue autoCPAP 10-15 cc of H2O pressure      -  Rx written for supplies.  She prefers to switch DME company.        -  Refer to Dr. Pena to see if there are some alternative options for treating apnea or to use the oral appliance with CPAP as she may note better quality sleep overall.        -  Follow annually to evaluate compliance and renew supplies      -  I also discussed in depth the risk of leaving sleep apnea untreated including hypertension, heart failure, arrhythmia, MI and stroke.    ______________________________________________________________________    HPI:    Patito Driscoll presents today for follow-up for BLANCA.  She states that she is doing well on her CPAP.  She feels refreshed.  However, she states that she went away for 1 week and slept fine without it.  She feels like she was actually dreaming more off the machine.  She is wondering if there is anything more to be done or alternative options such as an oral appliance.        Review of Systems:  Review of Systems   Constitutional: Negative.    Eyes: Negative.    Respiratory: Negative.     Genitourinary: Negative.    Musculoskeletal: Negative.    Allergic/Immunologic: Negative.    Neurological: Negative.    Hematological: Negative.          Social history updates:  Social History     Tobacco Use   Smoking Status Never   Smokeless Tobacco Never     Social History     Socioeconomic History    Marital status: /Civil Union     Spouse name: Not on file    Number of children: Not on file    Years of education: Not on file    Highest education level: Not on file   Occupational  "History    Not on file   Tobacco Use    Smoking status: Never    Smokeless tobacco: Never   Vaping Use    Vaping status: Never Used   Substance and Sexual Activity    Alcohol use: Yes     Comment: 2 a month    Drug use: Never    Sexual activity: Not on file   Other Topics Concern    Not on file   Social History Narrative    Not on file     Social Determinants of Health     Financial Resource Strain: Low Risk  (11/13/2023)    Overall Financial Resource Strain (CARDIA)     Difficulty of Paying Living Expenses: Not hard at all   Food Insecurity: Not on file   Transportation Needs: No Transportation Needs (11/13/2023)    PRAPARE - Transportation     Lack of Transportation (Medical): No     Lack of Transportation (Non-Medical): No   Physical Activity: Not on file   Stress: Not on file   Social Connections: Not on file   Intimate Partner Violence: Not on file   Housing Stability: Not on file       PhysicalExamination:  Vitals:   /80   Pulse 89   Ht 5' 6.3\" (1.684 m)   Wt 104 kg (228 lb 12.8 oz)   SpO2 97%   BMI 36.60 kg/m²     General:  Pleasant, Awake alert and oriented x 3, conversant without conversational dyspnea, NAD, normal affect  HEENT:  PERRL, Sclera noninjected, nonicteric OU, Nares patent,  no craniofacial abnormalities, Mucous membranes, moist, no oral lesions, normal dentition, Mallampati class 4  NECK: Trachea midline, no accessory muscle use, no stridor, no cervical or supraclavicular adenopathy, JVP not elevated  CARDIAC: Reg, single s1/S2, no m/r/g  PULM: CTA bilaterally no wheezing, rhonchi or rales  ABD: Normoactive bowel sounds, soft nontender, nondistended, no rebound, no rigidity, no guarding  EXT: No cyanosis, no clubbing, no edema, normal capillary refill  NEURO: no focal neurologic deficits, AAOx3, moving all extremities appropriately    Diagnostic Data:  Labs:  I personally reviewed the most recent laboratory data pertinent to today's visit  not applicable    I have personally reviewed " "pertinent lab results.  Lab Results   Component Value Date    WBC 7.22 10/06/2022    HGB 13.2 10/06/2022    HCT 40.8 10/06/2022    MCV 93 10/06/2022     10/06/2022     Lab Results   Component Value Date    CALCIUM 9.2 04/24/2023    K 4.8 04/24/2023    CO2 29 04/24/2023     04/24/2023    BUN 21 04/24/2023    CREATININE 1.04 04/24/2023     No results found for: \"IGE\"  Lab Results   Component Value Date    ALT 16 04/24/2023    AST 13 04/24/2023    ALKPHOS 67 04/24/2023     Lab Results   Component Value Date    FERRITIN 42 12/02/2019     Lab Results   Component Value Date    LFZUNBPI68 296 12/02/2019     Lab Results   Component Value Date    FOLATE >17.5 12/02/2019       Sleep studies:  PSG 2012: AHI 22, REM AHI 60, with an oxygen salome of 80%.     Home study: 10/5/21  (YODIT) of 7.9  The lowest SpO2 recorded is 73%.     New Compliance Data:  2/27/24 - 3/27/24                                  Type of CPAP:  autoPAP 10-15, mean pressure 11.1, max pressure 14.1                                   Percent usage: 100%, > 4hrs 100%                                   Average time used: 8 hrs 36 mins                                   Residual AHI: 0.6    Compliance Data:  2/14/23 - 3/15/23                                  Type of CPAP:  autoPAP 10-15, mean pressure 11.1, max pressure 14                                   Percent usage: 83%                                   Average time used: 8 hrs 11 mins                                   Residual AHI: 0.5      Jimi Aguilar, DO    "

## 2024-04-01 NOTE — LETTER
April 1, 2024     Lea Reyes, MD  [address] [facility] [address]    Patient: Patito Driscoll   YOB: 1955   Date of Visit: 4/1/2024       Dear Dr. Reyes:    Thank you for referring Patito Driscoll to me for evaluation. Below are my notes for this consultation.    If you have questions, please do not hesitate to call me. I look forward to following your patient along with you.         Sincerely,        Jimi Aguilar DO        CC: No Recipients    Jimi Aguilar DO  4/1/2024 10:57 AM  Sign when Signing Visit  Progress Note - Sleep Medicine  Patito Driscoll 68 y.o. female MRN: [id number]       Impression & Plan:   68 y.o. F with PMHx of DM, multiple thyroid nodules, HTN, depression, allergic rhinitis who comes in for BLANCA follow up.  1.  Mild to moderate BLANCA (YODIT - 7.9 - AHI -22) who has good compliance and clinical response to autoCPAP 10-15.  Residual AHI - 0.5      -  Continue autoCPAP 10-15 cc of H2O pressure      -  Rx written for supplies.  She prefers to switch DME company.        -  Refer to Dr. Pena to see if there are some alternative options for treating apnea or to use the oral appliance with CPAP as she may note better quality sleep overall.        -  Follow annually to evaluate compliance and renew supplies      -  I also discussed in depth the risk of leaving sleep apnea untreated including hypertension, heart failure, arrhythmia, MI and stroke.    ______________________________________________________________________    HPI:    Patito Driscoll presents today for follow-up for BLANCA.  She states that she is doing well on her CPAP.  She feels refreshed.  However, she states that she went away for 1 week and slept fine without it.  She feels like she was actually dreaming more off the machine.  She is wondering if there is anything more to be done or alternative options such as an oral appliance.        Review of Systems:  Review of Systems   Constitutional: Negative.  "   Eyes: Negative.    Respiratory: Negative.     Genitourinary: Negative.    Musculoskeletal: Negative.    Allergic/Immunologic: Negative.    Neurological: Negative.    Hematological: Negative.          Social history updates:  Social History     Tobacco Use   Smoking Status Never   Smokeless Tobacco Never     Social History     Socioeconomic History   • Marital status: /Civil Union     Spouse name: Not on file   • Number of children: Not on file   • Years of education: Not on file   • Highest education level: Not on file   Occupational History   • Not on file   Tobacco Use   • Smoking status: Never   • Smokeless tobacco: Never   Vaping Use   • Vaping status: Never Used   Substance and Sexual Activity   • Alcohol use: Yes     Comment: 2 a month   • Drug use: Never   • Sexual activity: Not on file   Other Topics Concern   • Not on file   Social History Narrative   • Not on file     Social Determinants of Health     Financial Resource Strain: Low Risk  (11/13/2023)    Overall Financial Resource Strain (CARDIA)    • Difficulty of Paying Living Expenses: Not hard at all   Food Insecurity: Not on file   Transportation Needs: No Transportation Needs (11/13/2023)    PRAPARE - Transportation    • Lack of Transportation (Medical): No    • Lack of Transportation (Non-Medical): No   Physical Activity: Not on file   Stress: Not on file   Social Connections: Not on file   Intimate Partner Violence: Not on file   Housing Stability: Not on file       PhysicalExamination:  Vitals:   /80   Pulse 89   Ht 5' 6.3\" (1.684 m)   Wt 104 kg (228 lb 12.8 oz)   SpO2 97%   BMI 36.60 kg/m²     General:  Pleasant, Awake alert and oriented x 3, conversant without conversational dyspnea, NAD, normal affect  HEENT:  PERRL, Sclera noninjected, nonicteric OU, Nares patent,  no craniofacial abnormalities, Mucous membranes, moist, no oral lesions, normal dentition, Mallampati class 4  NECK: Trachea midline, no accessory muscle use, no " "stridor, no cervical or supraclavicular adenopathy, JVP not elevated  CARDIAC: Reg, single s1/S2, no m/r/g  PULM: CTA bilaterally no wheezing, rhonchi or rales  ABD: Normoactive bowel sounds, soft nontender, nondistended, no rebound, no rigidity, no guarding  EXT: No cyanosis, no clubbing, no edema, normal capillary refill  NEURO: no focal neurologic deficits, AAOx3, moving all extremities appropriately    Diagnostic Data:  Labs:  I personally reviewed the most recent laboratory data pertinent to today's visit  not applicable    I have personally reviewed pertinent lab results.  Lab Results   Component Value Date    WBC 7.22 10/06/2022    HGB 13.2 10/06/2022    HCT 40.8 10/06/2022    MCV 93 10/06/2022     10/06/2022     Lab Results   Component Value Date    CALCIUM 9.2 04/24/2023    K 4.8 04/24/2023    CO2 29 04/24/2023     04/24/2023    BUN 21 04/24/2023    CREATININE 1.04 04/24/2023     No results found for: \"IGE\"  Lab Results   Component Value Date    ALT 16 04/24/2023    AST 13 04/24/2023    ALKPHOS 67 04/24/2023     Lab Results   Component Value Date    FERRITIN 42 12/02/2019     Lab Results   Component Value Date    QQHDUGNV90 296 12/02/2019     Lab Results   Component Value Date    FOLATE >17.5 12/02/2019       Sleep studies:  PSG 2012: AHI 22, REM AHI 60, with an oxygen salome of 80%.     Home study: 10/5/21  (YODIT) of 7.9  The lowest SpO2 recorded is 73%.     New Compliance Data:  2/27/24 - 3/27/24                                  Type of CPAP:  autoPAP 10-15, mean pressure 11.1, max pressure 14.1                                   Percent usage: 100%, > 4hrs 100%                                   Average time used: 8 hrs 36 mins                                   Residual AHI: 0.6    Compliance Data:  2/14/23 - 3/15/23                                  Type of CPAP:  autoPAP 10-15, mean pressure 11.1, max pressure 14                                   Percent usage: 83%                               "     Average time used: 8 hrs 11 mins                                   Residual AHI: 0.5      Jimi Aguilar DO

## 2024-04-04 ENCOUNTER — TELEPHONE (OUTPATIENT)
Dept: SLEEP CENTER | Facility: CLINIC | Age: 69
End: 2024-04-04

## 2024-04-05 LAB

## 2024-05-01 ENCOUNTER — VBI (OUTPATIENT)
Dept: ADMINISTRATIVE | Facility: OTHER | Age: 69
End: 2024-05-01

## 2024-05-06 ENCOUNTER — TELEPHONE (OUTPATIENT)
Age: 69
End: 2024-05-06

## 2024-05-06 DIAGNOSIS — E11.65 TYPE 2 DIABETES MELLITUS WITH HYPERGLYCEMIA, WITHOUT LONG-TERM CURRENT USE OF INSULIN (HCC): Primary | ICD-10-CM

## 2024-05-08 ENCOUNTER — APPOINTMENT (OUTPATIENT)
Dept: LAB | Facility: CLINIC | Age: 69
End: 2024-05-08
Payer: COMMERCIAL

## 2024-05-08 LAB
ALBUMIN SERPL BCP-MCNC: 4.2 G/DL (ref 3.5–5)
ALP SERPL-CCNC: 78 U/L (ref 34–104)
ALT SERPL W P-5'-P-CCNC: 17 U/L (ref 7–52)
ANION GAP SERPL CALCULATED.3IONS-SCNC: 11 MMOL/L (ref 4–13)
AST SERPL W P-5'-P-CCNC: 15 U/L (ref 13–39)
BASOPHILS # BLD AUTO: 0.03 THOUSANDS/ÂΜL (ref 0–0.1)
BASOPHILS NFR BLD AUTO: 0 % (ref 0–1)
BILIRUB SERPL-MCNC: 0.54 MG/DL (ref 0.2–1)
BUN SERPL-MCNC: 18 MG/DL (ref 5–25)
CALCIUM SERPL-MCNC: 9.6 MG/DL (ref 8.4–10.2)
CHLORIDE SERPL-SCNC: 100 MMOL/L (ref 96–108)
CHOLEST SERPL-MCNC: 157 MG/DL
CO2 SERPL-SCNC: 29 MMOL/L (ref 21–32)
CREAT SERPL-MCNC: 0.97 MG/DL (ref 0.6–1.3)
CREAT UR-MCNC: 93.6 MG/DL
EOSINOPHIL # BLD AUTO: 0.16 THOUSAND/ÂΜL (ref 0–0.61)
EOSINOPHIL NFR BLD AUTO: 2 % (ref 0–6)
ERYTHROCYTE [DISTWIDTH] IN BLOOD BY AUTOMATED COUNT: 12.7 % (ref 11.6–15.1)
EST. AVERAGE GLUCOSE BLD GHB EST-MCNC: 189 MG/DL
GFR SERPL CREATININE-BSD FRML MDRD: 60 ML/MIN/1.73SQ M
GLUCOSE P FAST SERPL-MCNC: 130 MG/DL (ref 65–99)
HBA1C MFR BLD: 8.2 %
HCT VFR BLD AUTO: 39.9 % (ref 34.8–46.1)
HDLC SERPL-MCNC: 67 MG/DL
HGB BLD-MCNC: 13.1 G/DL (ref 11.5–15.4)
IMM GRANULOCYTES # BLD AUTO: 0.03 THOUSAND/UL (ref 0–0.2)
IMM GRANULOCYTES NFR BLD AUTO: 0 % (ref 0–2)
LDLC SERPL CALC-MCNC: 71 MG/DL (ref 0–100)
LYMPHOCYTES # BLD AUTO: 1.72 THOUSANDS/ÂΜL (ref 0.6–4.47)
LYMPHOCYTES NFR BLD AUTO: 23 % (ref 14–44)
MCH RBC QN AUTO: 29.9 PG (ref 26.8–34.3)
MCHC RBC AUTO-ENTMCNC: 32.8 G/DL (ref 31.4–37.4)
MCV RBC AUTO: 91 FL (ref 82–98)
MICROALBUMIN UR-MCNC: <7 MG/L
MICROALBUMIN/CREAT 24H UR: <7 MG/G CREATININE (ref 0–30)
MONOCYTES # BLD AUTO: 0.41 THOUSAND/ÂΜL (ref 0.17–1.22)
MONOCYTES NFR BLD AUTO: 6 % (ref 4–12)
NEUTROPHILS # BLD AUTO: 5.01 THOUSANDS/ÂΜL (ref 1.85–7.62)
NEUTS SEG NFR BLD AUTO: 69 % (ref 43–75)
NONHDLC SERPL-MCNC: 90 MG/DL
NRBC BLD AUTO-RTO: 0 /100 WBCS
PLATELET # BLD AUTO: 303 THOUSANDS/UL (ref 149–390)
PMV BLD AUTO: 9.9 FL (ref 8.9–12.7)
POTASSIUM SERPL-SCNC: 4.4 MMOL/L (ref 3.5–5.3)
PROT SERPL-MCNC: 7.4 G/DL (ref 6.4–8.4)
RBC # BLD AUTO: 4.38 MILLION/UL (ref 3.81–5.12)
SODIUM SERPL-SCNC: 140 MMOL/L (ref 135–147)
TRIGL SERPL-MCNC: 95 MG/DL
TSH SERPL DL<=0.05 MIU/L-ACNC: 4.01 UIU/ML (ref 0.45–4.5)
WBC # BLD AUTO: 7.36 THOUSAND/UL (ref 4.31–10.16)

## 2024-05-08 PROCEDURE — 82570 ASSAY OF URINE CREATININE: CPT | Performed by: INTERNAL MEDICINE

## 2024-05-08 PROCEDURE — 36415 COLL VENOUS BLD VENIPUNCTURE: CPT | Performed by: INTERNAL MEDICINE

## 2024-05-08 PROCEDURE — 80053 COMPREHEN METABOLIC PANEL: CPT | Performed by: INTERNAL MEDICINE

## 2024-05-08 PROCEDURE — 84443 ASSAY THYROID STIM HORMONE: CPT | Performed by: INTERNAL MEDICINE

## 2024-05-08 PROCEDURE — 83036 HEMOGLOBIN GLYCOSYLATED A1C: CPT | Performed by: INTERNAL MEDICINE

## 2024-05-08 PROCEDURE — 85025 COMPLETE CBC W/AUTO DIFF WBC: CPT | Performed by: INTERNAL MEDICINE

## 2024-05-08 PROCEDURE — 82043 UR ALBUMIN QUANTITATIVE: CPT | Performed by: INTERNAL MEDICINE

## 2024-05-08 PROCEDURE — 80061 LIPID PANEL: CPT | Performed by: INTERNAL MEDICINE

## 2024-05-13 ENCOUNTER — TELEPHONE (OUTPATIENT)
Dept: ADMINISTRATIVE | Facility: OTHER | Age: 69
End: 2024-05-13

## 2024-05-13 ENCOUNTER — OFFICE VISIT (OUTPATIENT)
Dept: INTERNAL MEDICINE CLINIC | Facility: CLINIC | Age: 69
End: 2024-05-13
Payer: COMMERCIAL

## 2024-05-13 VITALS
SYSTOLIC BLOOD PRESSURE: 132 MMHG | HEART RATE: 89 BPM | WEIGHT: 231.2 LBS | DIASTOLIC BLOOD PRESSURE: 76 MMHG | BODY MASS INDEX: 36.98 KG/M2 | OXYGEN SATURATION: 99 %

## 2024-05-13 DIAGNOSIS — E66.01 CLASS 2 SEVERE OBESITY WITH SERIOUS COMORBIDITY AND BODY MASS INDEX (BMI) OF 36.0 TO 36.9 IN ADULT, UNSPECIFIED OBESITY TYPE (HCC): ICD-10-CM

## 2024-05-13 DIAGNOSIS — F32.5 MAJOR DEPRESSIVE DISORDER WITH SINGLE EPISODE, IN FULL REMISSION (HCC): ICD-10-CM

## 2024-05-13 DIAGNOSIS — E11.65 TYPE 2 DIABETES MELLITUS WITH HYPERGLYCEMIA, WITHOUT LONG-TERM CURRENT USE OF INSULIN (HCC): Primary | ICD-10-CM

## 2024-05-13 LAB

## 2024-05-13 PROCEDURE — G2211 COMPLEX E/M VISIT ADD ON: HCPCS | Performed by: INTERNAL MEDICINE

## 2024-05-13 PROCEDURE — 99214 OFFICE O/P EST MOD 30 MIN: CPT | Performed by: INTERNAL MEDICINE

## 2024-05-13 NOTE — ASSESSMENT & PLAN NOTE
She was on Trulicity and Ozempic years ago but stopped due to cost  Will see if she can go back on it  Continue metformin 2g a day   May need to stop glipizide -5mg/day is not enough, BID dosing caused a rash  Establishing with new endocrinologist in a few weeks  Lab Results   Component Value Date    HGBA1C 8.2 (H) 05/08/2024

## 2024-05-13 NOTE — LETTER
Diabetic Eye Exam Form    Date Requested: 24  Patient: Patito Driscoll  Patient : 1955   Referring Provider: Lea Reyes, MD      DIABETIC Eye Exam Date _______________________________      Type of Exam MUST be documented for Diabetic Eye Exams. Please CHECK ONE.     Retinal Exam       Dilated Retinal Exam       OCT       Optomap-Iris Exam      Fundus Photography       Left Eye - Please check Retinopathy or No Retinopathy        Exam did show retinopathy    Exam did not show retinopathy       Right Eye - Please check Retinopathy or No Retinopathy       Exam did show retinopathy    Exam did not show retinopathy       Comments __________________________________________________________    Practice Providing Exam ______________________________________________    Exam Performed By (print name) _______________________________________      Provider Signature ___________________________________________________      These reports are needed for  compliance.  Please fax this completed form and a copy of the Diabetic Eye Exam report to our office located at [address] [place] [address] as soon as possible via Fax [phone] pietro Joy: Phone [phone]  We thank you for your assistance in treating our mutual patient.

## 2024-05-13 NOTE — PROGRESS NOTES
Name: Patito Driscoll      : 1955      MRN: [id number]  Encounter Provider: Lea Reyes, MD  Encounter Date: 2024   [facility]    Assessment & Plan     1. Type 2 diabetes mellitus with hyperglycemia, without long-term current use of insulin ([facility])  Assessment & Plan:  She was on Trulicity and Ozempic years ago but stopped due to cost  Will see if she can go back on it  Continue metformin 2g a day   May need to stop glipizide -5mg/day is not enough, BID dosing caused a rash  Establishing with new endocrinologist in a few weeks  Lab Results   Component Value Date    HGBA1C 8.2 (H) 2024     Orders:  -     semaglutide, 0.25 or 0.5 mg/dose, (Ozempic, 0.25 or 0.5 MG/DOSE,) 2 mg/3 mL injection pen; 0.25 mg under the skin every 7 days for 4 doses (28 days), THEN 0.5 mg under the skin every 7 days    2. Major depressive disorder with single episode, in full remission ([facility])  Assessment & Plan:  Stable on citalopram 10mg      3. Class 2 severe obesity with serious comorbidity and body mass index (BMI) of 36.0 to 36.9 in adult, unspecified obesity type ([facility])  Assessment & Plan:  Resume Ozempic      Continue to try to get records of colonoscopy performed in   Declines EMG for numbness in the left hand at this time       Subjective      Here for a follow up  Left hand numb on the dorsum for a few months, feels like a severe brush burn   Known Raynauds  Leg soreness and better with applying frankincense  She is on a lower dose of citalopram - 10mg  Glipizide reduced bec of a rash and now only taking once a day  Recent labs reviewed and A1C 8.2  Lipids controlled on atorvastatin twice a week        Review of Systems   Respiratory:  Negative for shortness of breath.    Cardiovascular:  Negative for chest pain and palpitations.   Gastrointestinal:  Negative for abdominal pain, constipation and diarrhea.   Genitourinary:  Negative for difficulty urinating.    Musculoskeletal:  Positive for myalgias (legs).   Neurological:  Positive for numbness (top of the left hand for a few months). Negative for dizziness and headaches.       Current Outpatient Medications on File Prior to Visit   Medication Sig   • albuterol (PROVENTIL HFA,VENTOLIN HFA) 90 mcg/act inhaler Inhale 2 puffs every 4 (four) hours as needed   • Aspirin Buf,CaCarb-MgCarb-MgO, 81 MG TABS Take 1 tablet by mouth daily   • atorvastatin (LIPITOR) 10 mg tablet TAKE 1 TABLET DAILY (Patient taking differently: Taking 2 tablets a week)   • Cholecalciferol 50 MCG (2000 UT) TABS Take by mouth daily    • citalopram (CeleXA) 20 mg tablet TAKE 1 TABLET DAILY (Patient taking differently: 10 mg)   • fluticasone (Flonase) 50 mcg/act nasal spray 2 sprays into each nostril daily   • glipiZIDE (GLUCOTROL) 5 mg tablet Take 5 mg by mouth in the morning Taking 1 tablet daily   • Krill Oil 1000 MG CAPS Take by mouth in the morning   • lisinopril (ZESTRIL) 10 mg tablet TAKE 1 TABLET DAILY   • magnesium 30 MG tablet Take 200 mg by mouth 3 (three) times a week Once a week   • metFORMIN (GLUCOPHAGE) 500 mg tablet Take 4 tablets by mouth in the morning 1 tablet in the morning and 3 tablets at night   • multivitamin (THERAGRAN) TABS Take 1 tablet by mouth daily   • OneTouch Ultra test strip    • triamcinolone (KENALOG) 0.1 % cream Apply topically 2 (two) times a day   • Turmeric (QC TUMERIC COMPLEX PO) Take 2,000 Int'l Units/L by mouth   • vitamin B-12 (VITAMIN B-12) 500 mcg tablet Take 1 tablet by mouth daily       Objective     /76 (BP Location: Left arm, Patient Position: Sitting, Cuff Size: Standard)   Pulse 89   Wt 105 kg (231 lb 3.2 oz)   SpO2 99%   BMI 36.98 kg/m²     Physical Exam  Constitutional:       Appearance: She is well-developed. She is obese. She is not ill-appearing.   Eyes:      Conjunctiva/sclera: Conjunctivae normal.   Cardiovascular:      Rate and Rhythm: Normal rate and regular rhythm.      Heart sounds:  Normal heart sounds. No murmur heard.  Pulmonary:      Effort: Pulmonary effort is normal. No respiratory distress.      Breath sounds: Normal breath sounds. No wheezing or rales.   Abdominal:      General: There is no distension.      Palpations: Abdomen is soft. There is no mass.      Tenderness: There is no abdominal tenderness. There is no guarding or rebound.   Musculoskeletal:      Cervical back: Neck supple.      Right lower leg: No edema.      Left lower leg: No edema.   Neurological:      Mental Status: She is alert and oriented to person, place, and time.   Psychiatric:         Mood and Affect: Mood normal.         Behavior: Behavior normal.         Thought Content: Thought content normal.         Judgment: Judgment normal.       Lea Reyes, MD

## 2024-05-13 NOTE — TELEPHONE ENCOUNTER
Upon review of the In Basket request and the patient's chart, initial outreach has been made via fax to facility. Please see Contacts section for details.     Thank you  Benita Gauthier

## 2024-05-13 NOTE — TELEPHONE ENCOUNTER
----- Message from Lea Reyes, MD sent at 5/13/2024  9:43 AM EDT -----  05/13/24 9:43 AM    Hello, our patient Patito Driscoll has had Diabetic Eye Exam completed/performed. Please assist in updating the patient chart by making an External outreach to [facility] facility located in [place]. The date of service is April 2024.    Thank you,  Lea Reyes, MD  [facility]

## 2024-05-14 NOTE — TELEPHONE ENCOUNTER
Upon review of the In Basket request we were able to locate, review, and update the patient chart as requested for Diabetic Eye Exam.    Any additional questions or concerns should be emailed to the Practice Liaisons via the appropriate education email address, please do not reply via In Basket.    Thank you  Benita Gauthier

## 2024-05-23 LAB
DME PARACHUTE DELIVERY DATE ACTUAL: NORMAL
DME PARACHUTE DELIVERY DATE EXPECTED: NORMAL
DME PARACHUTE DELIVERY DATE REQUESTED: NORMAL
DME PARACHUTE ITEM DESCRIPTION: NORMAL
DME PARACHUTE ORDER STATUS: NORMAL
DME PARACHUTE SUPPLIER NAME: NORMAL
DME PARACHUTE SUPPLIER PHONE: NORMAL

## 2024-05-31 ENCOUNTER — TELEPHONE (OUTPATIENT)
Dept: INTERNAL MEDICINE CLINIC | Facility: CLINIC | Age: 69
End: 2024-05-31

## 2024-05-31 DIAGNOSIS — E11.65 TYPE 2 DIABETES MELLITUS WITH HYPERGLYCEMIA, WITHOUT LONG-TERM CURRENT USE OF INSULIN (HCC): Primary | ICD-10-CM

## 2024-05-31 RX ORDER — GLIPIZIDE 5 MG/1
5 TABLET ORAL DAILY
Qty: 30 TABLET | Refills: 11 | Status: SHIPPED | OUTPATIENT
Start: 2024-05-31 | End: 2026-01-09

## 2024-05-31 NOTE — TELEPHONE ENCOUNTER
[facility] flagged patient as non compliant, falling behind on PDC measure due to glipizide being taken differently than prescribed. Will send updated RX per last PCP office visit with correct directions.       Pharmacist Tracking Tool  Reason For Outreach: Population Health Pharmacist  Demographics:  Intervention Method: Chart Review  Type of Intervention: Follow-Up  Topics Addressed: Diabetes  Pharmacologic Interventions: Dose or Frequency Adjusted  Non-Pharmacologic Interventions: Care coordination  Time:  Direct Patient Care:  0  mins  Care Coordination:  15  mins  Recommendation Recipient: Provider  Outcome: Accepted

## 2024-06-14 DIAGNOSIS — E11.65 TYPE 2 DIABETES MELLITUS WITH HYPERGLYCEMIA, WITHOUT LONG-TERM CURRENT USE OF INSULIN (HCC): ICD-10-CM

## 2024-06-14 RX ORDER — GLIPIZIDE 5 MG/1
5 TABLET ORAL DAILY
Qty: 90 TABLET | Refills: 1 | Status: SHIPPED | OUTPATIENT
Start: 2024-06-14 | End: 2026-01-23

## 2024-06-18 DIAGNOSIS — I10 HYPERTENSION, ESSENTIAL, BENIGN: ICD-10-CM

## 2024-06-18 RX ORDER — LISINOPRIL 10 MG/1
TABLET ORAL
Qty: 90 TABLET | Refills: 3 | Status: SHIPPED | OUTPATIENT
Start: 2024-06-18

## 2024-07-05 ENCOUNTER — CONSULT (OUTPATIENT)
Dept: ENDOCRINOLOGY | Facility: CLINIC | Age: 69
End: 2024-07-05
Payer: COMMERCIAL

## 2024-07-05 VITALS
BODY MASS INDEX: 36.1 KG/M2 | TEMPERATURE: 97 F | SYSTOLIC BLOOD PRESSURE: 130 MMHG | DIASTOLIC BLOOD PRESSURE: 78 MMHG | OXYGEN SATURATION: 97 % | WEIGHT: 224.6 LBS | HEART RATE: 69 BPM | HEIGHT: 66 IN

## 2024-07-05 DIAGNOSIS — E66.01 CLASS 2 SEVERE OBESITY WITH SERIOUS COMORBIDITY AND BODY MASS INDEX (BMI) OF 36.0 TO 36.9 IN ADULT, UNSPECIFIED OBESITY TYPE (HCC): ICD-10-CM

## 2024-07-05 DIAGNOSIS — E78.2 MIXED HYPERLIPIDEMIA: ICD-10-CM

## 2024-07-05 DIAGNOSIS — E04.2 MULTIPLE THYROID NODULES: ICD-10-CM

## 2024-07-05 DIAGNOSIS — E11.65 TYPE 2 DIABETES MELLITUS WITH HYPERGLYCEMIA, WITHOUT LONG-TERM CURRENT USE OF INSULIN (HCC): Primary | ICD-10-CM

## 2024-07-05 PROBLEM — E66.811 OBESITY (BMI 30.0-34.9): Status: RESOLVED | Noted: 2020-08-10 | Resolved: 2024-07-05

## 2024-07-05 PROBLEM — E66.9 OBESITY (BMI 30.0-34.9): Status: RESOLVED | Noted: 2020-08-10 | Resolved: 2024-07-05

## 2024-07-05 PROCEDURE — 99204 OFFICE O/P NEW MOD 45 MIN: CPT | Performed by: INTERNAL MEDICINE

## 2024-07-05 NOTE — ASSESSMENT & PLAN NOTE
She has benign nodules with mostly colloid cysts and a Lt lower pole mixed cystic/solid /spongieform nodule.    She seems to be stable since 2019.  4/8/24 US thyroid:   Rt thyroid - no nodules. 3 colloid cysts all sub-centimeter.  Nodule #1 Lt lower pole 1.cm complex/partial/ spongieform.  Lt upper pole 13mm colloid cyst.    If repeat US thyroid was unremarkable/stable in 2026, may stop screening until there is a clinical symptom.

## 2024-07-05 NOTE — PATIENT INSTRUCTIONS
Instructions    Diet:   Take 1500 angelina/day of balanced diet with 1/3rd carbs, 1/3rd protein and rest fiber and small amount fat.   Drink at least 64 oz fluids daily.    Lifestyle:   30 min brisk activity most days. 150min-220min/week of cardiac and muscle building exercise that causes sweating.  Consider [facility] medical fitness training program.  Medical fitness: follow these exercise links  Full Version - [email or web address]     Warmup - [email or web address]     Lower Body - [email or web address]     Upper Body - [email or web address]     Core -  [email or web address]    Fasting:  Can consider after becoming regular with exercise - 14 to 24 hrs fasting 1-3 times a week.    Medications:   look up Wegovy, Zepbound, saxenda - weight loss meds.  Consider switching from medications that cause weight gain to weight neutral medications.    Other:   Make sure you are treated appropriately if you have sleep apnea.  May consider bariatric surgery if we dont see benefit over 6-12 months of all above.

## 2024-07-05 NOTE — PROGRESS NOTES
"    New Consult Note      CC: Type 2 diabetes    History of Present Illness:   68 yr female with Type 2 diabetes since 2012, multiple thyroid nodules, HLD, Asthma, HTN, obesity BMI 36, BLANCA, Raynauds phenomenon, PCOS Hx, vit D deficiency.    She is presenting for transition of care.    SAGM: checks twice daily    Current meds: Jardiance caused a UTI  Metformin 1000mg PO BID  Ozempic 0.5mg weekly    Opthamology:   Podiatry:   vaccination:   Dental:  Pancreatitis:     Ace/ARB: lisinopril  Statin: lipitor    Thyroid issues:  4/8/24 US thyroid:   Rt thyroid - no nodules. 3 colloid cysts all sub-centimeter.  Nodule #1 Lt lower pole 1.cm complex/partial/ spongieform.  Lt upper pole 13mm colloid cyst.    3/21/22 DXA : Was normal.    Physical Exam:  Body mass index is 35.92 kg/m².  /78 (BP Location: Left arm, Patient Position: Sitting, Cuff Size: Standard)   Pulse 69   Temp (!) 97 °F (36.1 °C) (Tympanic)   Ht 5' 6.3\" (1.684 m)   Wt 102 kg (224 lb 9.6 oz)   SpO2 97%   BMI 35.92 kg/m²    Vitals:    07/05/24 1015   Weight: 102 kg (224 lb 9.6 oz)        Physical Exam  Constitutional:       General: She is not in acute distress.     Appearance: She is well-developed.   HENT:      Head: Normocephalic and atraumatic.      Nose: Nose normal.   Eyes:      Conjunctiva/sclera: Conjunctivae normal.   Pulmonary:      Effort: Pulmonary effort is normal.   Abdominal:      General: There is no distension.   Musculoskeletal:      Cervical back: Normal range of motion and neck supple.   Skin:     Findings: No rash.      Comments: No icterus   Neurological:      Mental Status: She is alert and oriented to person, place, and time.         Labs:   Lab Results   Component Value Date    HGBA1C 8.2 (H) 05/08/2024       Lab Results   Component Value Date    PHO6KDWKPWRT 4.007 05/08/2024    TSH 2.56 03/14/2022       Lab Results   Component Value Date    CREATININE 0.97 05/08/2024    CREATININE 1.04 04/24/2023    CREATININE 1.03 10/06/2022 "    BUN 18 05/08/2024    K 4.4 05/08/2024     05/08/2024    CO2 29 05/08/2024     GFR, Calculated   Date Value Ref Range Status   07/30/2020 63 >60 mL/min/1.73m2 Final     Comment:     mL/min per 1.73 square meters                                            Normal Function or Mild Renal    Disease (if clinically at risk):  >or=60  Moderately Decreased:                30-59  Severely Decreased:                  15-29  Renal Failure:                         <15                                            -American GFR: multiply reported GFR by 1.16    Please note that the eGFR is based on the CKD-EPI calculation, and is not intended to be used for drug dosing.                                            Note: Calculated GFR may not be an accurate indicator of renal function if the patient's renal function is not in a steady state.     eGFRcr   Date Value Ref Range Status   04/24/2023 59 (L) >59 Final     eGFR   Date Value Ref Range Status   05/08/2024 60 ml/min/1.73sq m Final       Lab Results   Component Value Date    ALT 17 05/08/2024    AST 15 05/08/2024    ALKPHOS 78 05/08/2024       Lab Results   Component Value Date    CHOLESTEROL 157 05/08/2024    CHOLESTEROL 157 10/06/2022     Lab Results   Component Value Date    HDL 67 05/08/2024    HDL 55 10/06/2022     Lab Results   Component Value Date    TRIG 95 05/08/2024    TRIG 115 10/06/2022     Lab Results   Component Value Date    NONHDLC 90 05/08/2024         Assessment/Plan:    1. Type 2 diabetes mellitus with hyperglycemia, without long-term current use of insulin ([facility])  Assessment & Plan:  She is uncontrolled. Goal A1c is 6.5%.    Today we discussed all aspects of diabetes including pathophysiology, risk factors, complications, SAGM, CGM, diet, lifestyle modifications, medical fitness training, diabetes education, goals of therapy, follow up needs and medications including insulin, metformin, Jardiance and GLP1 agonists.  Advised to maintain goal  blood sugars 70-180mg/dL.    We agreed to continue metformin and increase Ozempic. Reiterated importance of diet and lifestyle changes.    Follow up in 3 months.    Lab Results   Component Value Date    HGBA1C 8.2 (H) 05/08/2024     Orders:  -     Ambulatory Referral to Endocrinology  -     Ambulatory Referral to Medical Fitness Exercise Specialist; Future  -     semaglutide, 1 mg/dose, (Ozempic, 1 MG/DOSE,) 4 mg/3 mL injection pen; Inject 0.75 mL (1 mg total) under the skin every 7 days  -     Continous glucose monitoring beau placement; Future  -     Continous glucose monitoring beau intrepretation; Future  2. Multiple thyroid nodules  Assessment & Plan:  She has benign nodules with mostly colloid cysts and a Lt lower pole mixed cystic/solid /spongieform nodule.    She seems to be stable since 2019.  4/8/24 US thyroid:   Rt thyroid - no nodules. 3 colloid cysts all sub-centimeter.  Nodule #1 Lt lower pole 1.cm complex/partial/ spongieform.  Lt upper pole 13mm colloid cyst.    If repeat US thyroid was unremarkable/stable in 2026, may stop screening until there is a clinical symptom.      3. Class 2 severe obesity with serious comorbidity and body mass index (BMI) of 36.0 to 36.9 in adult, unspecified obesity type ([facility])  Assessment & Plan:  Today we discussed all aspects of obesity and metabolism including pathophysiology, risk factors, complications, goal of sustaining weight loss long term, usual propensity to regain weight with short term approaches, follow up needs and medications - efficacy and limitations.   Discussed role of endocrinopathies, inflammatory conditions, sleep disorders, mental health disorders, lifestyle issues and polypharmacy and weight gain.  Briefly discussed bariatric surgery.  Diet: carb controlled diet <1500cal/day/ VLCD, 64oz fluids/day   lifestyle modifications: intermittent fasting and 10,000 steps/day  medical fitness training: muscle building  education: nutrition input    4. Mixed  hyperlipidemia  Assessment & Plan:  ASCVD is high. On statin,        I have spent a total time of 42 minutes on 07/05/24 in caring for this patient including greater than 50% of this time was spent in counseling/coordination of care as listed above.       Discussed with the patient and all questioned fully answered. She will contact me with concerns.    Sony Dennis

## 2024-07-05 NOTE — ASSESSMENT & PLAN NOTE
She is uncontrolled. Goal A1c is 6.5%.    Today we discussed all aspects of diabetes including pathophysiology, risk factors, complications, SAGM, CGM, diet, lifestyle modifications, medical fitness training, diabetes education, goals of therapy, follow up needs and medications including insulin, metformin, Jardiance and GLP1 agonists.  Advised to maintain goal blood sugars 70-180mg/dL.    We agreed to continue metformin and increase Ozempic. Reiterated importance of diet and lifestyle changes.    Follow up in 3 months.    Lab Results   Component Value Date    HGBA1C 8.2 (H) 05/08/2024

## 2024-07-09 ENCOUNTER — TELEPHONE (OUTPATIENT)
Dept: ENDOCRINOLOGY | Facility: CLINIC | Age: 69
End: 2024-07-09

## 2024-07-09 ENCOUNTER — TELEPHONE (OUTPATIENT)
Age: 69
End: 2024-07-09

## 2024-07-09 DIAGNOSIS — E11.65 TYPE 2 DIABETES MELLITUS WITH HYPERGLYCEMIA, WITHOUT LONG-TERM CURRENT USE OF INSULIN (HCC): Primary | ICD-10-CM

## 2024-07-09 NOTE — TELEPHONE ENCOUNTER
Reason for call:   [x] Refill   [] Prior Auth  [] Other:     Office:   [] PCP/Provider -   [x] Specialty/Provider - [facility]    Medication: Metformin 500mg    Dose/Frequency: pt said they take 1 tab am and 3 tabs in the evening    Quantity: 400    Pharmacy: [facility] - [address] [phone]     Does the patient have enough for 3 days?   [x] Yes   [] No - Send as HP to POD

## 2024-07-09 NOTE — TELEPHONE ENCOUNTER
----- Message from Sony Dennis MD sent at 7/5/2024 12:16 PM EDT -----  Please bring her in for a cgm pro. I placed order during visit on 7/5/24.

## 2024-07-09 NOTE — TELEPHONE ENCOUNTER
PA for semaglutide, 1 mg/dose, (Ozempic, 1 MG/DOSE,) 4 mg/3 mL injection pen     Submitted via    [x][email or web address]-KEY [id number]  []SurescriTriggerMail-Case ID #    []Faxed to plan   []Other website    []Phone call Case ID #      Office notes sent, clinical questions answered. Awaiting determination    Turnaround time for your insurance to make a decision on your Prior Authorization can take 7-21 business days.

## 2024-07-09 NOTE — TELEPHONE ENCOUNTER
Patient called to schedule .     Attempted to contact office. Unsuccessful    Please call pt back to schedule CGM

## 2024-07-10 ENCOUNTER — TELEPHONE (OUTPATIENT)
Age: 69
End: 2024-07-10

## 2024-07-10 NOTE — TELEPHONE ENCOUNTER
PA for semaglutide, 1 mg/dose, (Ozempic, 1 MG/DOSE,) 4 mg/3 mL injection pen  Approved     Date(s) approved 05/09/24-07/08/25    Case # [id number]    Patient advised by          [x] MyChart Message  [] Phone call   []LMOM  []L/M to call office as no active Communication consent on file  []Unable to leave detailed message as VM not approved on Communication consent       Pharmacy advised by    [x]Fax  []Phone call    Approval letter scanned into Media Yes

## 2024-07-12 ENCOUNTER — CLINICAL SUPPORT (OUTPATIENT)
Dept: ENDOCRINOLOGY | Facility: CLINIC | Age: 69
End: 2024-07-12
Payer: COMMERCIAL

## 2024-07-12 DIAGNOSIS — E11.65 TYPE 2 DIABETES MELLITUS WITH HYPERGLYCEMIA, WITHOUT LONG-TERM CURRENT USE OF INSULIN (HCC): ICD-10-CM

## 2024-07-12 PROCEDURE — 95250 CONT GLUC MNTR PHYS/QHP EQP: CPT | Performed by: INTERNAL MEDICINE

## 2024-07-12 NOTE — PROGRESS NOTES
Continous glucose monitoring brandyn placement     Date/Time  7/12/2024 2:07 PM     Performed by  Alice Philippe LPN   Authorized by  Sony Dennsi MD     Universal Protocol   Consent: Verbal consent obtained. Written consent obtained.  Consent given by: patient  Timeout called at: 7/12/2024 2:07 PM.  Patient understanding: patient states understanding of the procedure being performed  Patient identity confirmed: verbally with patient      Local anesthesia used: no     Anesthesia   Local anesthesia used: no     Sedation   Patient sedated: no        Specimen: no    Culture: no   Procedure Details   Procedure Notes: Brandyn sensor placed on left arm  SN: [id number]  EXP: 1/31/25  Patient tolerance: patient tolerated the procedure well with no immediate complications

## 2024-07-26 ENCOUNTER — TELEPHONE (OUTPATIENT)
Dept: ENDOCRINOLOGY | Facility: CLINIC | Age: 69
End: 2024-07-26

## 2024-07-26 ENCOUNTER — OFFICE VISIT (OUTPATIENT)
Dept: ENDOCRINOLOGY | Facility: CLINIC | Age: 69
End: 2024-07-26
Payer: COMMERCIAL

## 2024-07-26 VITALS
SYSTOLIC BLOOD PRESSURE: 128 MMHG | HEART RATE: 91 BPM | DIASTOLIC BLOOD PRESSURE: 88 MMHG | OXYGEN SATURATION: 98 % | TEMPERATURE: 97.2 F | RESPIRATION RATE: 14 BRPM | HEIGHT: 66 IN | WEIGHT: 218 LBS | BODY MASS INDEX: 35.03 KG/M2

## 2024-07-26 DIAGNOSIS — E11.65 TYPE 2 DIABETES MELLITUS WITH HYPERGLYCEMIA, WITHOUT LONG-TERM CURRENT USE OF INSULIN (HCC): ICD-10-CM

## 2024-07-26 PROCEDURE — 95251 CONT GLUC MNTR ANALYSIS I&R: CPT | Performed by: INTERNAL MEDICINE

## 2024-07-26 NOTE — PROGRESS NOTES
Continous glucose monitoring brandyn intrepretation     Date/Time  7/26/2024 1:58 PM     Performed by  Alice Philippe LPN   Authorized by  Sony Dennis MD     Universal Protocol   Consent: Verbal consent obtained. Written consent obtained.  Consent given by: patient  Timeout called at: 7/26/2024 1:58 PM.  Patient understanding: patient states understanding of the procedure being performed  Patient identity confirmed: verbally with patient      Local anesthesia used: no     Anesthesia   Local anesthesia used: no     Sedation   Patient sedated: no        Specimen: no    Culture: no   Procedure Details   Procedure Notes: Brandyn sensor removed from patients left arm

## 2024-07-30 ENCOUNTER — TREATMENT (OUTPATIENT)
Dept: OTHER | Facility: HOSPITAL | Age: 69
End: 2024-07-30
Payer: COMMERCIAL

## 2024-07-30 DIAGNOSIS — E11.65 TYPE 2 DIABETES MELLITUS WITH HYPERGLYCEMIA, WITHOUT LONG-TERM CURRENT USE OF INSULIN (HCC): Primary | ICD-10-CM

## 2024-07-30 PROCEDURE — 95251 CONT GLUC MNTR ANALYSIS I&R: CPT | Performed by: INTERNAL MEDICINE

## 2024-07-30 NOTE — PROGRESS NOTES
CGM data review::  Device: beau Dates: 7/12/24-7/26/24 Usage: 100 % Av glu: 211 mg/dL  SD:  mg/dL CV: 23  % GMI:  8.4 %  TIR: 32 %  TAR: 49+19 %  TBR: 0 %    Glycemic patters:  both fasting and after meal hyperglycemia. Will improve with Ozempic but may also use glipizide until next visit. Hypoglycemia: No

## 2024-07-31 DIAGNOSIS — F32.5 MAJOR DEPRESSIVE DISORDER WITH SINGLE EPISODE, IN FULL REMISSION (HCC): ICD-10-CM

## 2024-07-31 RX ORDER — CITALOPRAM 20 MG/1
TABLET ORAL
Qty: 90 TABLET | Refills: 3 | Status: SHIPPED | OUTPATIENT
Start: 2024-07-31

## 2024-08-12 ENCOUNTER — TELEPHONE (OUTPATIENT)
Dept: ENDOCRINOLOGY | Facility: CLINIC | Age: 69
End: 2024-08-12

## 2024-09-17 DIAGNOSIS — E11.65 TYPE 2 DIABETES MELLITUS WITH HYPERGLYCEMIA, WITHOUT LONG-TERM CURRENT USE OF INSULIN (HCC): ICD-10-CM

## 2024-09-17 NOTE — TELEPHONE ENCOUNTER
Reason for call:   [x] Refill   [] Prior Auth  [] Other:     Office:   [] PCP/Provider -   [x] Specialty/Provider - ENDOCRINOLOGY - Sony Dennis MD     Medication: metFORMIN (GLUCOPHAGE) 500 mg tablet     Dose/Frequency: Take 1 tablet (500 mg total) by mouth 4 (four) times a day 1 tablet in the morning and 3 tablets at night,     Quantity: 360 tablet     Pharmacy: [facility] - [address] [phone]    Does the patient have enough for 3 days?   [x] Yes   [] No - Send as HP to POD

## 2024-10-08 ENCOUNTER — TELEPHONE (OUTPATIENT)
Age: 69
End: 2024-10-08

## 2024-10-08 NOTE — TELEPHONE ENCOUNTER
Pt called new dose of ozempic cost is going to be 700 dollars. pt not sure what to do. she is on her last pen. the pt stated her last dose she was able to cover the cost.

## 2024-11-08 ENCOUNTER — RA CDI HCC (OUTPATIENT)
Dept: OTHER | Facility: HOSPITAL | Age: 69
End: 2024-11-08

## 2024-11-11 ENCOUNTER — APPOINTMENT (OUTPATIENT)
Dept: LAB | Facility: CLINIC | Age: 69
End: 2024-11-11
Payer: COMMERCIAL

## 2024-11-11 ENCOUNTER — TELEPHONE (OUTPATIENT)
Age: 69
End: 2024-11-11

## 2024-11-11 DIAGNOSIS — E11.65 TYPE 2 DIABETES MELLITUS WITH HYPERGLYCEMIA, WITHOUT LONG-TERM CURRENT USE OF INSULIN (HCC): ICD-10-CM

## 2024-11-11 DIAGNOSIS — I10 HYPERTENSION, ESSENTIAL, BENIGN: ICD-10-CM

## 2024-11-11 DIAGNOSIS — E78.2 MIXED HYPERLIPIDEMIA: ICD-10-CM

## 2024-11-11 DIAGNOSIS — I10 HYPERTENSION, ESSENTIAL, BENIGN: Primary | ICD-10-CM

## 2024-11-11 LAB
ALBUMIN SERPL BCG-MCNC: 3.9 G/DL (ref 3.5–5)
ALP SERPL-CCNC: 76 U/L (ref 34–104)
ALT SERPL W P-5'-P-CCNC: 21 U/L (ref 7–52)
ANION GAP SERPL CALCULATED.3IONS-SCNC: 9 MMOL/L (ref 4–13)
AST SERPL W P-5'-P-CCNC: 16 U/L (ref 13–39)
BASOPHILS # BLD AUTO: 0.04 THOUSANDS/ÂΜL (ref 0–0.1)
BASOPHILS NFR BLD AUTO: 1 % (ref 0–1)
BILIRUB SERPL-MCNC: 0.49 MG/DL (ref 0.2–1)
BUN SERPL-MCNC: 20 MG/DL (ref 5–25)
CALCIUM SERPL-MCNC: 9.1 MG/DL (ref 8.4–10.2)
CHLORIDE SERPL-SCNC: 98 MMOL/L (ref 96–108)
CHOLEST SERPL-MCNC: 155 MG/DL
CO2 SERPL-SCNC: 28 MMOL/L (ref 21–32)
CREAT SERPL-MCNC: 0.94 MG/DL (ref 0.6–1.3)
EOSINOPHIL # BLD AUTO: 0.21 THOUSAND/ÂΜL (ref 0–0.61)
EOSINOPHIL NFR BLD AUTO: 3 % (ref 0–6)
ERYTHROCYTE [DISTWIDTH] IN BLOOD BY AUTOMATED COUNT: 12.5 % (ref 11.6–15.1)
EST. AVERAGE GLUCOSE BLD GHB EST-MCNC: 197 MG/DL
GFR SERPL CREATININE-BSD FRML MDRD: 62 ML/MIN/1.73SQ M
GLUCOSE P FAST SERPL-MCNC: 149 MG/DL (ref 65–99)
HBA1C MFR BLD: 8.5 %
HCT VFR BLD AUTO: 41.1 % (ref 34.8–46.1)
HDLC SERPL-MCNC: 58 MG/DL
HGB BLD-MCNC: 13.1 G/DL (ref 11.5–15.4)
IMM GRANULOCYTES # BLD AUTO: 0.03 THOUSAND/UL (ref 0–0.2)
IMM GRANULOCYTES NFR BLD AUTO: 0 % (ref 0–2)
LDLC SERPL CALC-MCNC: 69 MG/DL (ref 0–100)
LYMPHOCYTES # BLD AUTO: 1.97 THOUSANDS/ÂΜL (ref 0.6–4.47)
LYMPHOCYTES NFR BLD AUTO: 27 % (ref 14–44)
MCH RBC QN AUTO: 30 PG (ref 26.8–34.3)
MCHC RBC AUTO-ENTMCNC: 31.9 G/DL (ref 31.4–37.4)
MCV RBC AUTO: 94 FL (ref 82–98)
MONOCYTES # BLD AUTO: 0.33 THOUSAND/ÂΜL (ref 0.17–1.22)
MONOCYTES NFR BLD AUTO: 5 % (ref 4–12)
NEUTROPHILS # BLD AUTO: 4.8 THOUSANDS/ÂΜL (ref 1.85–7.62)
NEUTS SEG NFR BLD AUTO: 64 % (ref 43–75)
NRBC BLD AUTO-RTO: 0 /100 WBCS
PLATELET # BLD AUTO: 293 THOUSANDS/UL (ref 149–390)
PMV BLD AUTO: 10.3 FL (ref 8.9–12.7)
POTASSIUM SERPL-SCNC: 4.2 MMOL/L (ref 3.5–5.3)
PROT SERPL-MCNC: 7.1 G/DL (ref 6.4–8.4)
RBC # BLD AUTO: 4.36 MILLION/UL (ref 3.81–5.12)
SODIUM SERPL-SCNC: 135 MMOL/L (ref 135–147)
TRIGL SERPL-MCNC: 141 MG/DL
TSH SERPL DL<=0.05 MIU/L-ACNC: 3.94 UIU/ML (ref 0.45–4.5)
WBC # BLD AUTO: 7.38 THOUSAND/UL (ref 4.31–10.16)

## 2024-11-11 PROCEDURE — 83036 HEMOGLOBIN GLYCOSYLATED A1C: CPT

## 2024-11-11 PROCEDURE — 36415 COLL VENOUS BLD VENIPUNCTURE: CPT

## 2024-11-11 PROCEDURE — 80053 COMPREHEN METABOLIC PANEL: CPT

## 2024-11-11 PROCEDURE — 84443 ASSAY THYROID STIM HORMONE: CPT

## 2024-11-11 PROCEDURE — 80061 LIPID PANEL: CPT

## 2024-11-11 PROCEDURE — 85025 COMPLETE CBC W/AUTO DIFF WBC: CPT

## 2024-11-11 NOTE — TELEPHONE ENCOUNTER
The patient called she is scheduled for a medicare wellness visit this Thursday   does she need to get blood work done for this appointment      please advise thank you

## 2024-11-11 NOTE — TELEPHONE ENCOUNTER
Yes , I entered orders for fasting blood work now since I do not see that she has blood work for endocrinology either

## 2024-11-12 ENCOUNTER — APPOINTMENT (OUTPATIENT)
Dept: LAB | Facility: CLINIC | Age: 69
End: 2024-11-12
Payer: COMMERCIAL

## 2024-11-12 LAB
CREAT UR-MCNC: 81.3 MG/DL
MICROALBUMIN UR-MCNC: 15.5 MG/L
MICROALBUMIN/CREAT 24H UR: 19 MG/G CREATININE (ref 0–30)

## 2024-11-12 PROCEDURE — 82043 UR ALBUMIN QUANTITATIVE: CPT

## 2024-11-12 PROCEDURE — 82570 ASSAY OF URINE CREATININE: CPT

## 2024-11-13 ENCOUNTER — TELEPHONE (OUTPATIENT)
Age: 69
End: 2024-11-13

## 2024-11-13 NOTE — TELEPHONE ENCOUNTER
Phone call from patient scheduled for appt on 11/20/24. Patient would like to know if labs are needed prior to appt - orders not avail in chart. Please contact patient.

## 2024-11-14 ENCOUNTER — OFFICE VISIT (OUTPATIENT)
Dept: INTERNAL MEDICINE CLINIC | Facility: CLINIC | Age: 69
End: 2024-11-14
Payer: COMMERCIAL

## 2024-11-14 VITALS
DIASTOLIC BLOOD PRESSURE: 80 MMHG | HEART RATE: 83 BPM | SYSTOLIC BLOOD PRESSURE: 140 MMHG | WEIGHT: 217 LBS | OXYGEN SATURATION: 97 % | BODY MASS INDEX: 34.71 KG/M2

## 2024-11-14 DIAGNOSIS — Z12.31 ENCOUNTER FOR SCREENING MAMMOGRAM FOR BREAST CANCER: ICD-10-CM

## 2024-11-14 DIAGNOSIS — E11.65 TYPE 2 DIABETES MELLITUS WITH HYPERGLYCEMIA, WITHOUT LONG-TERM CURRENT USE OF INSULIN (HCC): ICD-10-CM

## 2024-11-14 DIAGNOSIS — Z23 NEED FOR VACCINATION: ICD-10-CM

## 2024-11-14 DIAGNOSIS — Z00.00 MEDICARE ANNUAL WELLNESS VISIT, SUBSEQUENT: ICD-10-CM

## 2024-11-14 DIAGNOSIS — F32.5 MAJOR DEPRESSIVE DISORDER WITH SINGLE EPISODE, IN FULL REMISSION (HCC): Primary | ICD-10-CM

## 2024-11-14 DIAGNOSIS — Z00.6 ENCOUNTER FOR EXAMINATION FOR NORMAL COMPARISON OR CONTROL IN CLINICAL RESEARCH PROGRAM: ICD-10-CM

## 2024-11-14 PROCEDURE — 99214 OFFICE O/P EST MOD 30 MIN: CPT | Performed by: INTERNAL MEDICINE

## 2024-11-14 PROCEDURE — 90662 IIV NO PRSV INCREASED AG IM: CPT

## 2024-11-14 PROCEDURE — G0008 ADMIN INFLUENZA VIRUS VAC: HCPCS

## 2024-11-14 PROCEDURE — G0439 PPPS, SUBSEQ VISIT: HCPCS | Performed by: INTERNAL MEDICINE

## 2024-11-14 RX ORDER — GLIPIZIDE 5 MG/1
5 TABLET ORAL
Start: 2024-11-14

## 2024-11-14 RX ORDER — CITALOPRAM HYDROBROMIDE 10 MG/1
10 TABLET ORAL DAILY
Qty: 90 TABLET | Refills: 3 | Status: SHIPPED | OUTPATIENT
Start: 2024-11-14

## 2024-11-14 NOTE — PATIENT INSTRUCTIONS
Medicare Preventive Visit Patient Instructions  Thank you for completing your Welcome to Medicare Visit or Medicare Annual Wellness Visit today. Your next wellness visit will be due in one year (11/15/2025).  The screening/preventive services that you may require over the next 5-10 years are detailed below. Some tests may not apply to you based off risk factors and/or age. Screening tests ordered at today's visit but not completed yet may show as past due. Also, please note that scanned in results may not display below.  Preventive Screenings:  Service Recommendations Previous Testing/Comments   Colorectal Cancer Screening  * Colonoscopy    * Fecal Occult Blood Test (FOBT)/Fecal Immunochemical Test (FIT)  * Fecal DNA/Cologuard Test  * Flexible Sigmoidoscopy Age: 45-75 years old   Colonoscopy: every 10 years (may be performed more frequently if at higher risk)  OR  FOBT/FIT: every 1 year  OR  Cologuard: every 3 years  OR  Sigmoidoscopy: every 5 years  Screening may be recommended earlier than age 45 if at higher risk for colorectal cancer. Also, an individualized decision between you and your healthcare provider will decide whether screening between the ages of 76-85 would be appropriate. Colonoscopy: Not on file  FOBT/FIT: Not on file  Cologuard: Not on file  Sigmoidoscopy: Not on file          Breast Cancer Screening Age: 40+ years old  Frequency: every 1-2 years  Not required if history of left and right mastectomy Mammogram: 01/12/2024        Cervical Cancer Screening Between the ages of 21-29, pap smear recommended once every 3 years.   Between the ages of 30-65, can perform pap smear with HPV co-testing every 5 years.   Recommendations may differ for women with a history of total hysterectomy, cervical cancer, or abnormal pap smears in past. Pap Smear: Not on file        Hepatitis C Screening Once for adults born between 1945 and 1965  More frequently in patients at high risk for Hepatitis C Hep C Antibody:  04/06/2018        Diabetes Screening 1-2 times per year if you're at risk for diabetes or have pre-diabetes Fasting glucose: 149 mg/dL (11/11/2024)  A1C: 8.5 % (11/11/2024)      Cholesterol Screening Once every 5 years if you don't have a lipid disorder. May order more often based on risk factors. Lipid panel: 11/11/2024          Other Preventive Screenings Covered by Medicare:  Abdominal Aortic Aneurysm (AAA) Screening: covered once if your at risk. You're considered to be at risk if you have a family history of AAA.  Lung Cancer Screening: covers low dose CT scan once per year if you meet all of the following conditions: (1) Age 55-77; (2) No signs or symptoms of lung cancer; (3) Current smoker or have quit smoking within the last 15 years; (4) You have a tobacco smoking history of at least 20 pack years (packs per day multiplied by number of years you smoked); (5) You get a written order from a healthcare provider.  Glaucoma Screening: covered annually if you're considered high risk: (1) You have diabetes OR (2) Family history of glaucoma OR (3)  aged 50 and older OR (4)  American aged 65 and older  Osteoporosis Screening: covered every 2 years if you meet one of the following conditions: (1) You're estrogen deficient and at risk for osteoporosis based off medical history and other findings; (2) Have a vertebral abnormality; (3) On glucocorticoid therapy for more than 3 months; (4) Have primary hyperparathyroidism; (5) On osteoporosis medications and need to assess response to drug therapy.   Last bone density test (DXA Scan): 03/21/2022.  HIV Screening: covered annually if you're between the age of 15-65. Also covered annually if you are younger than 15 and older than 65 with risk factors for HIV infection. For pregnant patients, it is covered up to 3 times per pregnancy.    Immunizations:  Immunization Recommendations   Influenza Vaccine Annual influenza vaccination during flu season is  recommended for all persons aged >= 6 months who do not have contraindications   Pneumococcal Vaccine   * Pneumococcal conjugate vaccine = PCV13 (Prevnar 13), PCV15 (Vaxneuvance), PCV20 (Prevnar 20)  * Pneumococcal polysaccharide vaccine = PPSV23 (Pneumovax) Adults 19-63 yo with certain risk factors or if 65+ yo  If never received any pneumonia vaccine: recommend Prevnar 20 (PCV20)  Give PCV20 if previously received 1 dose of PCV13 or PPSV23   Hepatitis B Vaccine 3 dose series if at intermediate or high risk (ex: diabetes, end stage renal disease, liver disease)   Respiratory syncytial virus (RSV) Vaccine - COVERED BY MEDICARE PART D  * RSVPreF3 (Arexvy) CDC recommends that adults 60 years of age and older may receive a single dose of RSV vaccine using shared clinical decision-making (SCDM)   Tetanus (Td) Vaccine - COST NOT COVERED BY MEDICARE PART B Following completion of primary series, a booster dose should be given every 10 years to maintain immunity against tetanus. Td may also be given as tetanus wound prophylaxis.   Tdap Vaccine - COST NOT COVERED BY MEDICARE PART B Recommended at least once for all adults. For pregnant patients, recommended with each pregnancy.   Shingles Vaccine (Shingrix) - COST NOT COVERED BY MEDICARE PART B  2 shot series recommended in those 19 years and older who have or will have weakened immune systems or those 50 years and older     Health Maintenance Due:      Topic Date Due   • Colorectal Cancer Screening  Never done   • Breast Cancer Screening: Mammogram  01/12/2025   • Hepatitis C Screening  Completed     Immunizations Due:      Topic Date Due   • Influenza Vaccine (1) 09/01/2024   • COVID-19 Vaccine (4 - 2024-25 season) 09/01/2024     Advance Directives   What are advance directives?  Advance directives are legal documents that state your wishes and plans for medical care. These plans are made ahead of time in case you lose your ability to make decisions for yourself. Advance  directives can apply to any medical decision, such as the treatments you want, and if you want to donate organs.   What are the types of advance directives?  There are many types of advance directives, and each state has rules about how to use them. You may choose a combination of any of the following:  Living will:  This is a written record of the treatment you want. You can also choose which treatments you do not want, which to limit, and which to stop at a certain time. This includes surgery, medicine, IV fluid, and tube feedings.   Durable power of  for healthcare (DPAHC):  This is a written record that states who you want to make healthcare choices for you when you are unable to make them for yourself. This person, called a proxy, is usually a family member or a friend. You may choose more than 1 proxy.  Do not resuscitate (DNR) order:  A DNR order is used in case your heart stops beating or you stop breathing. It is a request not to have certain forms of treatment, such as CPR. A DNR order may be included in other types of advance directives.  Medical directive:  This covers the care that you want if you are in a coma, near death, or unable to make decisions for yourself. You can list the treatments you want for each condition. Treatment may include pain medicine, surgery, blood transfusions, dialysis, IV or tube feedings, and a ventilator (breathing machine).  Values history:  This document has questions about your views, beliefs, and how you feel and think about life. This information can help others choose the care that you would choose.  Why are advance directives important?  An advance directive helps you control your care. Although spoken wishes may be used, it is better to have your wishes written down. Spoken wishes can be misunderstood, or not followed. Treatments may be given even if you do not want them. An advance directive may make it easier for your family to make difficult choices about  your care.   Weight Management   Why it is important to manage your weight:  Being overweight increases your risk of health conditions such as heart disease, high blood pressure, type 2 diabetes, and certain types of cancer. It can also increase your risk for osteoarthritis, sleep apnea, and other respiratory problems. Aim for a slow, steady weight loss. Even a small amount of weight loss can lower your risk of health problems.  How to lose weight safely:  A safe and healthy way to lose weight is to eat fewer calories and get regular exercise. You can lose up about 1 pound a week by decreasing the number of calories you eat by 500 calories each day.   Healthy meal plan for weight management:  A healthy meal plan includes a variety of foods, contains fewer calories, and helps you stay healthy. A healthy meal plan includes the following:  Eat whole-grain foods more often.  A healthy meal plan should contain fiber. Fiber is the part of grains, fruits, and vegetables that is not broken down by your body. Whole-grain foods are healthy and provide extra fiber in your diet. Some examples of whole-grain foods are whole-wheat breads and pastas, oatmeal, brown rice, and bulgur.  Eat a variety of vegetables every day.  Include dark, leafy greens such as spinach, kale, chery greens, and mustard greens. Eat yellow and orange vegetables such as carrots, sweet potatoes, and winter squash.   Eat a variety of fruits every day.  Choose fresh or canned fruit (canned in its own juice or light syrup) instead of juice. Fruit juice has very little or no fiber.  Eat low-fat dairy foods.  Drink fat-free (skim) milk or 1% milk. Eat fat-free yogurt and low-fat cottage cheese. Try low-fat cheeses such as mozzarella and other reduced-fat cheeses.  Choose meat and other protein foods that are low in fat.  Choose beans or other legumes such as split peas or lentils. Choose fish, skinless poultry (chicken or turkey), or lean cuts of red meat  (beef or pork). Before you cook meat or poultry, cut off any visible fat.   Use less fat and oil.  Try baking foods instead of frying them. Add less fat, such as margarine, sour cream, regular salad dressing and mayonnaise to foods. Eat fewer high-fat foods. Some examples of high-fat foods include french fries, doughnuts, ice cream, and cakes.  Eat fewer sweets.  Limit foods and drinks that are high in sugar. This includes candy, cookies, regular soda, and sweetened drinks.  Exercise:  Exercise at least 30 minutes per day on most days of the week. Some examples of exercise include walking, biking, dancing, and swimming. You can also fit in more physical activity by taking the stairs instead of the elevator or parking farther away from stores. Ask your healthcare provider about the best exercise plan for you.      © Copyright [employer] 2018 Information is for End User's use only and may not be sold, redistributed or otherwise used for commercial purposes. All illustrations and images included in CareNotes® are the copyrighted property of [employer].D.A.M., Inc. or [employer]

## 2024-11-14 NOTE — PROGRESS NOTES
Name: Patito Driscoll      : 1955      MRN: [id number]  Encounter Provider: Lea Reyes, MD  Encounter Date: 2024   Encounter department: [facility]    Assessment & Plan  Major depressive disorder with single episode, in full remission (HCC)  She has been taking Celexa 10 mg and will continue    Orders:    citalopram (CeleXA) 10 mg tablet; Take 1 tablet (10 mg total) by mouth daily    Encounter for screening mammogram for breast cancer    Orders:    Mammo screening bilateral w 3d and cad; Future    Medicare annual wellness visit, subsequent         Type 2 diabetes mellitus with hyperglycemia, without long-term current use of insulin (HCC)  Resume glipizide a few weeks ago  Ozempic at 1 mg at $700 for 3 months so unaffordable for her  Continues to take metformin 2 g a day  Follow-up with endocrinology next week  Lab Results   Component Value Date    HGBA1C 8.5 (H) 2024       Orders:    glipiZIDE (GLUCOTROL) 5 mg tablet; Take 1 tablet (5 mg total) by mouth 2 (two) times a day before meals    Hemoglobin A1C; Future    Comprehensive metabolic panel; Future    CBC and differential; Future    Albumin / creatinine urine ratio; Future    Lipid Panel with Direct LDL reflex; Future    TSH, 3rd generation with Free T4 reflex; Future    Need for vaccination    Orders:    influenza vaccine, high-dose, PF 0.5 mL (Fluzone High Dose)       Preventive health issues were discussed with patient, and age appropriate screening tests were ordered as noted in patient's After Visit Summary. Personalized health advice and appropriate referrals for health education or preventive services given if needed, as noted in patient's After Visit Summary.    History of Present Illness     HPI   Patient Care Team:  Lea Reyes, MD as PCP - General (Internal Medicine)    Review of Systems   Constitutional:  Negative for unexpected weight change.   Respiratory:  Negative for shortness of breath.    Cardiovascular:   Negative for chest pain and palpitations.   Gastrointestinal:  Negative for abdominal pain.   Musculoskeletal:  Positive for arthralgias (left elbow when she has it flexed for a long time).   Neurological:         Occasional sharp pain in the toes     Medical History Reviewed by provider this encounter:  Tobacco  Allergies  Meds  Problems  Med Hx  Surg Hx  Fam Hx       Annual Wellness Visit Questionnaire   Patito is here for her Subsequent Wellness visit.     Health Risk Assessment:   Patient rates overall health as very good. Patient feels that their physical health rating is slightly better. Patient is very satisfied with their life. Eyesight was rated as same. Hearing was rated as same. Patient feels that their emotional and mental health rating is much better. Patients states they are never, rarely angry. Patient states they are sometimes unusually tired/fatigued. Pain experienced in the last 7 days has been some. Patient's pain rating has been 2/10. Patient states that she has experienced no weight loss or gain in last 6 months.     Depression Screening:   PHQ-9 Score: 1      Fall Risk Screening:   In the past year, patient has experienced: no history of falling in past year      Urinary Incontinence Screening:   Patient has leaked urine accidently in the last six months.     Home Safety:  Patient does not have trouble with stairs inside or outside of their home. Patient has working smoke alarms and has working carbon monoxide detector. Home safety hazards include: none.     Nutrition:   Current diet is Diabetic, Low Cholesterol, Low Saturated Fat and Low Carb.     Medications:   Patient is currently taking over-the-counter supplements. OTC medications include: see medication list. Patient is able to manage medications.     Activities of Daily Living (ADLs)/Instrumental Activities of Daily Living (IADLs):   Walk and transfer into and out of bed and chair?: Yes  Dress and groom yourself?: Yes    Bathe  or shower yourself?: Yes    Feed yourself? Yes  Do your laundry/housekeeping?: Yes  Manage your money, pay your bills and track your expenses?: Yes  Make your own meals?: Yes    Do your own shopping?: Yes    Previous Hospitalizations:   Any hospitalizations or ED visits within the last 12 months?: No      Advance Care Planning:   Living will: No    Durable POA for healthcare: No    Advanced directive: No      Cognitive Screening:   Provider or family/friend/caregiver concerned regarding cognition?: No    PREVENTIVE SCREENINGS      Cardiovascular Screening:    General: Screening Not Indicated and History Lipid Disorder      Diabetes Screening:     General: Screening Not Indicated and History Diabetes      Colorectal Cancer Screening:     General: Screening Current      Breast Cancer Screening:     General: Screening Current      Cervical Cancer Screening:    General: Screening Not Indicated      Osteoporosis Screening:    General: Screening Not Indicated      Abdominal Aortic Aneurysm (AAA) Screening:        General: Screening Not Indicated      Lung Cancer Screening:     General: Screening Not Indicated      Hepatitis C Screening:    General: Screening Current    Screening, Brief Intervention, and Referral to Treatment (SBIRT)    Screening  Typical number of drinks in a day: 0  Typical number of drinks in a week: 0  Interpretation: Low risk drinking behavior.    AUDIT-C Screenin) How often did you have a drink containing alcohol in the past year? monthly or less  2) How many drinks did you have on a typical day when you were drinking in the past year? 1 to 2  3) How often did you have 6 or more drinks on one occasion in the past year? never    AUDIT-C Score: 1  Interpretation: Score 0-2 (female): Negative screen for alcohol misuse    Single Item Drug Screening:  How often have you used an illegal drug (including marijuana) or a prescription medication for non-medical reasons in the past year? never    Single  Item Drug Screen Score: 0  Interpretation: Negative screen for possible drug use disorder    Social Drivers of Health     Financial Resource Strain: Low Risk  (11/13/2023)    Overall Financial Resource Strain (CARDIA)     Difficulty of Paying Living Expenses: Not hard at all   Food Insecurity: No Food Insecurity (11/7/2024)    Hunger Vital Sign     Worried About Running Out of Food in the Last Year: Never true     Ran Out of Food in the Last Year: Never true   Transportation Needs: No Transportation Needs (11/7/2024)    PRAPARE - Transportation     Lack of Transportation (Medical): No     Lack of Transportation (Non-Medical): No   Housing Stability: Low Risk  (11/14/2024)    Housing Stability Vital Sign     Unable to Pay for Housing in the Last Year: No     Number of Times Moved in the Last Year: 0     Homeless in the Last Year: No   Utilities: Not At Risk (11/7/2024)    [facility] Utilities     Threatened with loss of utilities: No     No results found.    Objective   /80   Pulse 83   Wt 98.4 kg (217 lb)   SpO2 97%   BMI 34.71 kg/m²     Physical Exam  Constitutional:       Appearance: She is not ill-appearing.   Cardiovascular:      Rate and Rhythm: Regular rhythm.      Pulses: no weak pulses.           Dorsalis pedis pulses are 2+ on the right side and 2+ on the left side.      Heart sounds: Normal heart sounds.   Pulmonary:      Breath sounds: Normal breath sounds.   Abdominal:      Palpations: Abdomen is soft.      Tenderness: There is no abdominal tenderness.   Musculoskeletal:      Cervical back: Neck supple.   Feet:      Right foot:      Skin integrity: No ulcer, skin breakdown, erythema, warmth, callus or dry skin.      Left foot:      Skin integrity: No ulcer, skin breakdown, erythema, warmth, callus or dry skin.   Psychiatric:         Mood and Affect: Mood normal.         Behavior: Behavior normal.         Patient's shoes and socks removed.    Right Foot/Ankle   Right Foot Inspection  Skin Exam: skin  normal and skin intact. No dry skin, no warmth, no callus, no erythema, no maceration, no abnormal color, no pre-ulcer, no ulcer and no callus.     Toe Exam: ROM and strength within normal limits.     Sensory   Monofilament testing: intact    Vascular  The right DP pulse is 2+.     Left Foot/Ankle  Left Foot Inspection  Skin Exam: skin normal and skin intact. No dry skin, no warmth, no erythema, no maceration, normal color, no pre-ulcer, no ulcer and no callus.     Toe Exam: ROM and strength within normal limits.     Sensory   Monofilament testing: intact    Vascular  The left DP pulse is 2+.     Assign Risk Category  No deformity present  No loss of protective sensation  No weak pulses  Risk: 2

## 2024-11-14 NOTE — ASSESSMENT & PLAN NOTE
Resume glipizide a few weeks ago  Ozempic at 1 mg at $700 for 3 months so unaffordable for her  Continues to take metformin 2 g a day  Follow-up with endocrinology next week  Lab Results   Component Value Date    HGBA1C 8.5 (H) 11/11/2024       Orders:    glipiZIDE (GLUCOTROL) 5 mg tablet; Take 1 tablet (5 mg total) by mouth 2 (two) times a day before meals    Hemoglobin A1C; Future    Comprehensive metabolic panel; Future    CBC and differential; Future    Albumin / creatinine urine ratio; Future    Lipid Panel with Direct LDL reflex; Future    TSH, 3rd generation with Free T4 reflex; Future

## 2024-11-14 NOTE — ASSESSMENT & PLAN NOTE
She has been taking Celexa 10 mg and will continue    Orders:    citalopram (CeleXA) 10 mg tablet; Take 1 tablet (10 mg total) by mouth daily

## 2024-11-15 ENCOUNTER — TELEPHONE (OUTPATIENT)
Dept: ADMINISTRATIVE | Facility: OTHER | Age: 69
End: 2024-11-15

## 2024-11-15 NOTE — TELEPHONE ENCOUNTER
Upon review of the In Basket request we were able to locate, review, and update the patient chart as requested for CRC: Colonoscopy.    Any additional questions or concerns should be emailed to the Practice Liaisons via the appropriate education email address, please do not reply via In Basket.    Thank you  Verito Mtz MA   [facility]

## 2024-11-15 NOTE — TELEPHONE ENCOUNTER
----- Message from Lea Reyes, MD sent at 11/14/2024 11:43 AM EST -----  11/14/24 11:44 AM    Hello, our patient Patito Driscoll has had CRC: Colonoscopy completed/performed. Please assist in updating the patient chart by pulling the document from the Media Tab. The date of service is January 2016.     Thank you,  Lea Reyes, MD  [facility]

## 2024-11-20 ENCOUNTER — OFFICE VISIT (OUTPATIENT)
Dept: ENDOCRINOLOGY | Facility: CLINIC | Age: 69
End: 2024-11-20
Payer: COMMERCIAL

## 2024-11-20 VITALS
HEIGHT: 66 IN | HEART RATE: 92 BPM | DIASTOLIC BLOOD PRESSURE: 68 MMHG | WEIGHT: 216 LBS | BODY MASS INDEX: 34.72 KG/M2 | OXYGEN SATURATION: 97 % | SYSTOLIC BLOOD PRESSURE: 134 MMHG | TEMPERATURE: 97.9 F

## 2024-11-20 DIAGNOSIS — E04.2 MULTIPLE THYROID NODULES: ICD-10-CM

## 2024-11-20 DIAGNOSIS — E66.812 CLASS 2 SEVERE OBESITY WITH SERIOUS COMORBIDITY AND BODY MASS INDEX (BMI) OF 36.0 TO 36.9 IN ADULT, UNSPECIFIED OBESITY TYPE (HCC): ICD-10-CM

## 2024-11-20 DIAGNOSIS — E66.01 CLASS 2 SEVERE OBESITY WITH SERIOUS COMORBIDITY AND BODY MASS INDEX (BMI) OF 36.0 TO 36.9 IN ADULT, UNSPECIFIED OBESITY TYPE (HCC): ICD-10-CM

## 2024-11-20 DIAGNOSIS — E78.2 MIXED HYPERLIPIDEMIA: ICD-10-CM

## 2024-11-20 DIAGNOSIS — E11.65 TYPE 2 DIABETES MELLITUS WITH HYPERGLYCEMIA, WITHOUT LONG-TERM CURRENT USE OF INSULIN (HCC): Primary | ICD-10-CM

## 2024-11-20 PROCEDURE — G2211 COMPLEX E/M VISIT ADD ON: HCPCS | Performed by: INTERNAL MEDICINE

## 2024-11-20 PROCEDURE — 99214 OFFICE O/P EST MOD 30 MIN: CPT | Performed by: INTERNAL MEDICINE

## 2024-11-20 NOTE — ASSESSMENT & PLAN NOTE
She is uncontrolled. Goal A1c is 6.5%. she lost 8 lbs.    She cannot afford ozempic until next January. We agreed to continue metformin, glipizide and increase Ozempic - provided samples to last 8 weeks. Reiterated importance of diet and lifestyle changes.    Follow up in 3 months.    Lab Results   Component Value Date    HGBA1C 8.5 (H) 11/11/2024

## 2024-11-20 NOTE — PROGRESS NOTES
"    Follow-up Patient Progress Note      CC: Type 2 diabetes     History of Present Illness:   68 yr female with Type 2 diabetes since 2012, multiple thyroid nodules, HLD, Asthma, HTN, obesity BMI 36, BLANCA, Raynauds phenomenon, PCOS Hx, vit D deficiency. Last visit was 7/5/24.     Since last visit, she lost 8 lbs.     SAGM: checks twice daily     Current meds: Jardiance caused a UTI  Metformin 500mg PO QID  Glipizide 5mg po BID  Ozempic 0.5mg weekly     Opthamology:   Podiatry:   vaccination:   Dental:  Pancreatitis:      Ace/ARB: lisinopril  Statin: lipitor     Thyroid issues:  4/8/24 US thyroid:   Rt thyroid - no nodules. 3 colloid cysts all sub-centimeter.  Nodule #1 Lt lower pole 1.cm complex/partial/ spongieform.  Lt upper pole 13mm colloid cyst.     3/21/22 DXA : Was normal.     Physical Exam:  Body mass index is 34.86 kg/m².  /68 (BP Location: Left arm, Patient Position: Sitting, Cuff Size: Standard)   Pulse 92   Temp 97.9 °F (36.6 °C) (Temporal)   Ht 5' 6\" (1.676 m)   Wt 98 kg (216 lb)   SpO2 97%   BMI 34.86 kg/m²    Vitals:    11/20/24 1042   Weight: 98 kg (216 lb)        Physical Exam  Constitutional:       General: She is not in acute distress.     Appearance: She is well-developed.   HENT:      Head: Normocephalic and atraumatic.      Nose: Nose normal.   Eyes:      Conjunctiva/sclera: Conjunctivae normal.   Pulmonary:      Effort: Pulmonary effort is normal.   Abdominal:      General: There is no distension.   Musculoskeletal:      Cervical back: Normal range of motion and neck supple.   Skin:     Findings: No rash.      Comments: No icterus   Neurological:      Mental Status: She is alert and oriented to person, place, and time.       Labs:   Lab Results   Component Value Date    HGBA1C 8.5 (H) 11/11/2024       Lab Results   Component Value Date    KHA2XRAZJAQC 3.941 11/11/2024    TSH 2.56 03/14/2022       Lab Results   Component Value Date    CREATININE 0.94 11/11/2024    CREATININE 0.97 " 05/08/2024    CREATININE 1.04 04/24/2023    BUN 20 11/11/2024    K 4.2 11/11/2024    CL 98 11/11/2024    CO2 28 11/11/2024     GFR, Calculated   Date Value Ref Range Status   07/30/2020 63 >60 mL/min/1.73m2 Final     Comment:     mL/min per 1.73 square meters                                            Normal Function or Mild Renal    Disease (if clinically at risk):  >or=60  Moderately Decreased:                30-59  Severely Decreased:                  15-29  Renal Failure:                         <15                                            -American GFR: multiply reported GFR by 1.16    Please note that the eGFR is based on the CKD-EPI calculation, and is not intended to be used for drug dosing.                                            Note: Calculated GFR may not be an accurate indicator of renal function if the patient's renal function is not in a steady state.     eGFRcr   Date Value Ref Range Status   04/24/2023 59 (L) >59 Final     eGFR   Date Value Ref Range Status   11/11/2024 62 ml/min/1.73sq m Final       Lab Results   Component Value Date    ALT 21 11/11/2024    AST 16 11/11/2024    ALKPHOS 76 11/11/2024       Lab Results   Component Value Date    CHOLESTEROL 155 11/11/2024    CHOLESTEROL 157 05/08/2024    CHOLESTEROL 157 10/06/2022     Lab Results   Component Value Date    HDL 58 11/11/2024    HDL 67 05/08/2024    HDL 55 10/06/2022     Lab Results   Component Value Date    TRIG 141 11/11/2024    TRIG 95 05/08/2024    TRIG 115 10/06/2022     Lab Results   Component Value Date    NONHDLC 90 05/08/2024         Assessment/Plan:    1. Type 2 diabetes mellitus with hyperglycemia, without long-term current use of insulin ([facility])  Assessment & Plan:  She is uncontrolled. Goal A1c is 6.5%. she lost 8 lbs.    She cannot afford ozempic until next January. We agreed to continue metformin, glipizide and increase Ozempic - provided samples to last 8 weeks. Reiterated importance of diet and lifestyle  changes.    Follow up in 3 months.    Lab Results   Component Value Date    HGBA1C 8.5 (H) 11/11/2024     2. Multiple thyroid nodules  Assessment & Plan:  She has benign nodules with mostly colloid cysts and a Lt lower pole mixed cystic/solid /spongieform nodule.    She seems to be stable since 2019.  4/8/24 US thyroid:   Rt thyroid - no nodules. 3 colloid cysts all sub-centimeter.  Nodule #1 Lt lower pole 1.cm complex/partial/ spongieform.  Lt upper pole 13mm colloid cyst.    If repeat US thyroid was unremarkable/stable in 2026, may stop screening until there is a clinical symptom.      3. Class 2 severe obesity with serious comorbidity and body mass index (BMI) of 36.0 to 36.9 in adult, unspecified obesity type (HCC)  Assessment & Plan:  She is improving with medical fitness training and ozempic.  4. Mixed hyperlipidemia  Assessment & Plan:  ASCVD is high. On statin,        I have spent a total time of 30 minutes on 11/20/24 in caring for this patient including greater than 50% of this time was spent in counseling/coordination of care as listed above.       Discussed with the patient and all questioned fully answered. She will contact me with concerns.    Sony Dennis

## 2025-01-06 DIAGNOSIS — E11.65 TYPE 2 DIABETES MELLITUS WITH HYPERGLYCEMIA, WITHOUT LONG-TERM CURRENT USE OF INSULIN (HCC): ICD-10-CM

## 2025-01-31 ENCOUNTER — TELEPHONE (OUTPATIENT)
Dept: SLEEP CENTER | Facility: CLINIC | Age: 70
End: 2025-01-31

## 2025-01-31 NOTE — TELEPHONE ENCOUNTER
Confirmation of order form for CPAP supplies received via fax from [facility]    Patient was last seen in office by Dr. Aguilar(who is no longer with the practice).  Patient is scheduled for follow up 4/17/25 with Dr. Stone.    Since Dr. Stone will not be in the [place] office for several weeks. Form emailed to Dr. Stone to complete and return to nursing staff.      Completed form to be faxed to [phone].

## 2025-03-12 ENCOUNTER — HOSPITAL ENCOUNTER (OUTPATIENT)
Dept: RADIOLOGY | Facility: MEDICAL CENTER | Age: 70
Discharge: HOME | End: 2025-03-12
Payer: COMMERCIAL

## 2025-03-12 VITALS — WEIGHT: 208 LBS | BODY MASS INDEX: 33.43 KG/M2 | HEIGHT: 66 IN

## 2025-03-12 DIAGNOSIS — Z12.31 ENCOUNTER FOR SCREENING MAMMOGRAM FOR BREAST CANCER: ICD-10-CM

## 2025-03-12 PROCEDURE — 77067 SCR MAMMO BI INCL CAD: CPT

## 2025-03-12 PROCEDURE — 77063 BREAST TOMOSYNTHESIS BI: CPT

## 2025-03-17 ENCOUNTER — OFFICE VISIT (OUTPATIENT)
Dept: ENDOCRINOLOGY | Facility: CLINIC | Age: 70
End: 2025-03-17
Payer: COMMERCIAL

## 2025-03-17 VITALS
HEIGHT: 66 IN | HEART RATE: 85 BPM | OXYGEN SATURATION: 96 % | TEMPERATURE: 97.2 F | BODY MASS INDEX: 33.75 KG/M2 | RESPIRATION RATE: 16 BRPM | SYSTOLIC BLOOD PRESSURE: 130 MMHG | DIASTOLIC BLOOD PRESSURE: 74 MMHG | WEIGHT: 210 LBS

## 2025-03-17 DIAGNOSIS — E66.01 CLASS 2 SEVERE OBESITY WITH SERIOUS COMORBIDITY AND BODY MASS INDEX (BMI) OF 36.0 TO 36.9 IN ADULT, UNSPECIFIED OBESITY TYPE (HCC): ICD-10-CM

## 2025-03-17 DIAGNOSIS — E66.812 CLASS 2 SEVERE OBESITY WITH SERIOUS COMORBIDITY AND BODY MASS INDEX (BMI) OF 36.0 TO 36.9 IN ADULT, UNSPECIFIED OBESITY TYPE (HCC): ICD-10-CM

## 2025-03-17 DIAGNOSIS — E04.2 MULTIPLE THYROID NODULES: ICD-10-CM

## 2025-03-17 DIAGNOSIS — E78.2 MIXED HYPERLIPIDEMIA: ICD-10-CM

## 2025-03-17 DIAGNOSIS — E11.65 TYPE 2 DIABETES MELLITUS WITH HYPERGLYCEMIA, WITHOUT LONG-TERM CURRENT USE OF INSULIN (HCC): Primary | ICD-10-CM

## 2025-03-17 PROCEDURE — G2211 COMPLEX E/M VISIT ADD ON: HCPCS | Performed by: INTERNAL MEDICINE

## 2025-03-17 PROCEDURE — 99214 OFFICE O/P EST MOD 30 MIN: CPT | Performed by: INTERNAL MEDICINE

## 2025-03-17 NOTE — ASSESSMENT & PLAN NOTE
She is uncontrolled. Goal A1c is 6.5%. she lost 6 lbs.    She has dorota on Ozempic 0.25mg weekly. Off glipizide and continues on metformin 1000mg po bid.  She reports some hyperglycemia.    We agreed to increase Ozempic and continue metformin. Encouraged increased muscle strengthening exercise.    Follow up in 3 months.    Lab Results   Component Value Date    HGBA1C 8.5 (H) 11/11/2024

## 2025-03-17 NOTE — PROGRESS NOTES
"    Follow-up Patient Progress Note      CC: Type 2 diabetes     History of Present Illness:   68 yr female with Type 2 diabetes since 2012, multiple thyroid nodules, HLD, Asthma, HTN, obesity BMI 36, BLANCA, Raynauds phenomenon, PCOS Hx, vit D deficiency. Last visit was 11/20/24.     Since last visit, she lost 6 lbs. Total 14 lbs. She is on a 1300 calorie diet.     SAGM: checks twice daily     Current meds: Jardiance caused a UTI  Metformin 1000mg PO BID  Ozempic 0.25mg weekly     Opthamology:   Podiatry:   vaccination:   Dental:  Pancreatitis:      Ace/ARB: lisinopril  Statin: lipitor     Thyroid issues:  4/8/24 US thyroid:   Rt thyroid - no nodules. 3 colloid cysts all sub-centimeter.  Nodule #1 Lt lower pole 1.cm complex/partial/ spongieform.  Lt upper pole 13mm colloid cyst.     3/21/22 DXA : Was normal.      Physical Exam:  Body mass index is 33.89 kg/m².  /74 (BP Location: Left arm, Patient Position: Sitting)   Pulse 85   Temp (!) 97.2 °F (36.2 °C) (Tympanic)   Resp 16   Ht 5' 6\" (1.676 m)   Wt 95.3 kg (210 lb)   SpO2 96%   BMI 33.89 kg/m²    Vitals:    03/17/25 0953   Weight: 95.3 kg (210 lb)        Physical Exam  Constitutional:       General: She is not in acute distress.     Appearance: She is well-developed.   HENT:      Head: Normocephalic and atraumatic.      Nose: Nose normal.   Eyes:      Conjunctiva/sclera: Conjunctivae normal.   Pulmonary:      Effort: Pulmonary effort is normal.   Abdominal:      General: There is no distension.   Musculoskeletal:      Cervical back: Normal range of motion and neck supple.   Skin:     Findings: No rash.      Comments: No icterus   Neurological:      Mental Status: She is alert and oriented to person, place, and time.       Labs:   Lab Results   Component Value Date    HGBA1C 8.5 (H) 11/11/2024       Lab Results   Component Value Date    [id number] 3.941 11/11/2024    TSH 2.56 03/14/2022       Lab Results   Component Value Date    CREATININE 0.94 " 11/11/2024    CREATININE 0.97 05/08/2024    CREATININE 1.04 04/24/2023    BUN 20 11/11/2024    K 4.2 11/11/2024    CL 98 11/11/2024    CO2 28 11/11/2024     GFR, Calculated   Date Value Ref Range Status   07/30/2020 63 >60 mL/min/1.73m2 Final     Comment:     mL/min per 1.73 square meters                                            Normal Function or Mild Renal    Disease (if clinically at risk):  >or=60  Moderately Decreased:                30-59  Severely Decreased:                  15-29  Renal Failure:                         <15                                            -American GFR: multiply reported GFR by 1.16    Please note that the eGFR is based on the CKD-EPI calculation, and is not intended to be used for drug dosing.                                            Note: Calculated GFR may not be an accurate indicator of renal function if the patient's renal function is not in a steady state.     eGFRcr   Date Value Ref Range Status   04/24/2023 59 (L) >59 Final     eGFR   Date Value Ref Range Status   11/11/2024 62 ml/min/1.73sq m Final       Lab Results   Component Value Date    ALT 21 11/11/2024    AST 16 11/11/2024    ALKPHOS 76 11/11/2024       Lab Results   Component Value Date    CHOLESTEROL 155 11/11/2024    CHOLESTEROL 157 05/08/2024    CHOLESTEROL 157 10/06/2022     Lab Results   Component Value Date    HDL 58 11/11/2024    HDL 67 05/08/2024    HDL 55 10/06/2022     Lab Results   Component Value Date    TRIG 141 11/11/2024    TRIG 95 05/08/2024    TRIG 115 10/06/2022     Lab Results   Component Value Date    NONHDLC 90 05/08/2024         Assessment/Plan:    1. Type 2 diabetes mellitus with hyperglycemia, without long-term current use of insulin ([facility])  Assessment & Plan:  She is uncontrolled. Goal A1c is 6.5%. she lost 6 lbs.    She has dorota on Ozempic 0.25mg weekly. Off glipizide and continues on metformin 1000mg po bid.  She reports some hyperglycemia.    We agreed to increase Ozempic and  continue metformin. Encouraged increased muscle strengthening exercise.    Follow up in 3 months.    Lab Results   Component Value Date    HGBA1C 8.5 (H) 11/11/2024     Orders:  -     semaglutide, 1 mg/dose, (Ozempic, 1 MG/DOSE,) 4 mg/3 mL injection pen; Inject 0.75 mL (1 mg total) under the skin every 7 days  -     Hemoglobin A1C; Future  -     Albumin / creatinine urine ratio; Future  -     metFORMIN (GLUCOPHAGE) 500 mg tablet; Take 2 tablets (1,000 mg total) by mouth 2 (two) times a day with meals  -     Comprehensive metabolic panel; Future  -     Ambulatory Referral to Medical Fitness Exercise Specialist; Future  2. Multiple thyroid nodules  Assessment & Plan:  She has benign nodules with mostly colloid cysts and a Lt lower pole mixed cystic/solid /spongieform nodule.    She seems to be stable since 2019.  4/8/24 US thyroid:   Rt thyroid - no nodules. 3 colloid cysts all sub-centimeter.  Nodule #1 Lt lower pole 1.cm complex/partial/ spongieform.  Lt upper pole 13mm colloid cyst.    If repeat US thyroid was unremarkable/stable in 2026, may stop screening until there is a clinical symptom.      Orders:  -     TSH, 3rd generation; Future  -     Thyroid Antibodies Panel; Future  -     T4, free; Future  -     T3; Future  3. Class 2 severe obesity with serious comorbidity and body mass index (BMI) of 36.0 to 36.9 in adult, unspecified obesity type (HCC)  Assessment & Plan:  She is improving with medical fitness training and ozempic.  4. Mixed hyperlipidemia  Assessment & Plan:  ASCVD is high. On statin,        I have spent a total time of  minutes on 03/17/25 in caring for this patient including greater than 50% of this time was spent in counseling/coordination of care as listed above.       Discussed with the patient and all questioned fully answered. She will contact me with concerns.    Sony Dennis

## 2025-03-18 ENCOUNTER — TELEPHONE (OUTPATIENT)
Age: 70
End: 2025-03-18

## 2025-03-18 NOTE — TELEPHONE ENCOUNTER
PA for Ozempic 1mg SUBMITTED to [employer]    via    [x]CMM-KEY: [id number]  []Surescripts-Case ID #   []Availity-Auth ID # NDC #   []Faxed to plan   []Other website   []Phone call Case ID #     [x]PA sent as URGENT    All office notes, labs and other pertaining documents and studies sent. Clinical questions answered. Awaiting determination from insurance company.     Turnaround time for your insurance to make a decision on your Prior Authorization can take 7-21 business days.

## 2025-03-19 NOTE — TELEPHONE ENCOUNTER
PA for Ozempic 1mg CANCELLED due to     [x]Approval on file-dates approved 05/09/2024-07/08/2025  []Medication already on Formulary  []Brand Name Preferred  []Patient no longer covered by insurance      Message sent to office clinical pool   No      Scanned into Media  yes

## 2025-04-17 ENCOUNTER — OFFICE VISIT (OUTPATIENT)
Dept: SLEEP CENTER | Facility: CLINIC | Age: 70
End: 2025-04-17
Payer: MEDICARE

## 2025-04-17 VITALS
HEIGHT: 66 IN | SYSTOLIC BLOOD PRESSURE: 130 MMHG | DIASTOLIC BLOOD PRESSURE: 70 MMHG | BODY MASS INDEX: 32.95 KG/M2 | WEIGHT: 205 LBS

## 2025-04-17 DIAGNOSIS — E66.812 CLASS 2 SEVERE OBESITY WITH SERIOUS COMORBIDITY AND BODY MASS INDEX (BMI) OF 36.0 TO 36.9 IN ADULT, UNSPECIFIED OBESITY TYPE (HCC): ICD-10-CM

## 2025-04-17 DIAGNOSIS — G47.33 OSA (OBSTRUCTIVE SLEEP APNEA): Primary | ICD-10-CM

## 2025-04-17 DIAGNOSIS — I10 HYPERTENSION, ESSENTIAL, BENIGN: ICD-10-CM

## 2025-04-17 DIAGNOSIS — E66.01 CLASS 2 SEVERE OBESITY WITH SERIOUS COMORBIDITY AND BODY MASS INDEX (BMI) OF 36.0 TO 36.9 IN ADULT, UNSPECIFIED OBESITY TYPE (HCC): ICD-10-CM

## 2025-04-17 PROCEDURE — G2211 COMPLEX E/M VISIT ADD ON: HCPCS | Performed by: INTERNAL MEDICINE

## 2025-04-17 PROCEDURE — 99214 OFFICE O/P EST MOD 30 MIN: CPT | Performed by: INTERNAL MEDICINE

## 2025-04-17 NOTE — ASSESSMENT & PLAN NOTE
Mild BLANCA with YODIT of 7.9 events per hour  Has been using CPAP with improvement in sleep quality and excessive daytime sleepiness averaging 7 to 8 hours of sleep  Review compliance  4/9/2020 2500% using the machine  Averaging 8 hours 40 minutes  Pressure 10-15  Nasal mask, no significant leak residual AHI of 0.3 events per hour  Orders:    PAP DME Resupply/Reorder

## 2025-04-17 NOTE — PATIENT INSTRUCTIONS
"Patient Education     Sleep apnea in adults   The Basics   Written by the doctors and editors at [facility]   What is sleep apnea? -- Sleep apnea is a condition that makes you stop breathing for short periods while you are asleep. There are 2 types of sleep apnea. One is called \"obstructive sleep apnea.\" The other is called \"central sleep apnea.\"  In obstructive sleep apnea, you stop breathing because your throat narrows or closes (figure 1). In central sleep apnea, you stop breathing because your brain does not send the right signals to your muscles to make you breathe. When people talk about sleep apnea, they are usually referring to obstructive sleep apnea, which is what this article is about.  People with sleep apnea do not know that they stop breathing when they are asleep. But they do sometimes wake up startled or gasping for breath. They also often hear from loved ones that they snore.  What are the symptoms of sleep apnea? -- The main symptoms of sleep apnea are loud snoring, tiredness, and daytime sleepiness. Other symptoms can include:   Restless sleep   Waking up choking or gasping   Morning headaches, dry mouth, or sore throat   Waking up often to urinate   Waking up feeling unrested or groggy   Trouble thinking clearly or remembering things  Some people with sleep apnea don't have symptoms, or don't realize that they have them.  Should I see a doctor or nurse? -- Yes. If you think that you might have sleep apnea, see your doctor.  Is there a test for sleep apnea? -- Yes. First, your doctor or nurse will ask about your symptoms. If you have a bed partner, they might also ask that person if you snore or gasp in your sleep. If the doctor or nurse suspects that you have sleep apnea, they might send you for a \"sleep study.\"  Sleep studies can sometimes be done at home, but they are usually done in a sleep lab. For the study, you spend the night in the lab, and you are hooked up to different machines that " "monitor your heart rate, breathing, and other body functions. The results of the test tell your doctor or nurse if you have the disorder.  Is there anything I can do on my own to help my sleep apnea? -- Yes. Some things that might help:   Try to avoid sleeping on your back, if possible. This might help some people.   Lose weight, if you have excess body weight.   Get regular physical activity. This might help you lose weight. But even if it doesn't, being active is good for your health.   Avoid alcohol, especially in the evening. Alcohol can make sleep apnea worse.  How is sleep apnea treated? -- Treatment can include:   Weight loss - As mentioned above, weight loss can help if you have excess weight or obesity. But losing weight can be challenging, and it takes time to lose enough weight to help with your sleep apnea. Most people need other treatment while they work on losing weight.   CPAP - The most effective treatment for sleep apnea is a device that keeps your airway open while you sleep. Treatment with this device is called \"continuous positive airway pressure\" (\"CPAP\"). People getting CPAP wear a face mask at night that keeps them breathing (figure 2).  If your doctor or nurse recommends a CPAP machine, be patient about using it. The mask might seem uncomfortable to wear at first, and the machine might seem noisy, but using the machine can really help you. People with sleep apnea who use a CPAP machine feel more rested and generally feel better.  If CPAP does not work, your doctor might suggest other treatment. Options might include:   An oral device - This is a device that you wear in your mouth. It is called an \"oral appliance\" or \"mandibular advancement device.\" It helps keep your airway open while you sleep.   Hypoglossal nerve stimulation - This involves a procedure to implant a small device into your chest. The device has a wire that connects to the nerve under your tongue. While you are sleeping, it " sends an electrical signal that causes the tongue to push forward. This helps open up your airway.   Surgery to widen your airway - This might involve removing your tonsils or other tissue that blocks the airway.  Is sleep apnea dangerous? -- It can be. Risks include:   Accidents - People with sleep apnea do not get good-quality sleep, so they are often tired and not alert. This puts them at risk for car accidents and other types of accidents.   Other health problems - Studies show that people with sleep apnea are more likely than others to have high blood pressure, heart attacks, and other serious heart problems. Some people also have mood changes or depression.  In people with severe sleep apnea, getting treated (for example, with CPAP) can help lower these risks.  All topics are updated as new evidence becomes available and our peer review process is complete.  This topic retrieved from [employer] on: Feb 28, 2024.  Topic 53695 Version 18.0  Release: 32.2.4 - C32.58  © 2024 UpToDate, Inc. and/or its affiliates. All rights reserved.  figure 1: Airway in a person with sleep apnea     Normally, when a person sleeps, the airway remains open, and air can pass from the nose and mouth to the lungs. In a person with sleep apnea, parts of the throat and mouth drop into the airway and block off the flow of air. This can cause loud snoring and interrupt breathing for short periods.  Graphic 84673 Version 6.0  figure 2: Continuous positive airway pressure (CPAP) for sleep apnea     The CPAP mask gently blows air into your nose while you sleep. It puts just enough pressure on your airway to keep it from closing. The mask in this picture fits over just the nose. Other CPAP devices have masks that fit over the nose and mouth.  Graphic 80588 Version 5.0  Consumer Information Use and Disclaimer   Disclaimer: This generalized information is a limited summary of diagnosis, treatment, and/or medication information. It is not meant to  be comprehensive and should be used as a tool to help the user understand and/or assess potential diagnostic and treatment options. It does NOT include all information about conditions, treatments, medications, side effects, or risks that may apply to a specific patient. It is not intended to be medical advice or a substitute for the medical advice, diagnosis, or treatment of a health care provider based on the health care provider's examination and assessment of a patient's specific and unique circumstances. Patients must speak with a health care provider for complete information about their health, medical questions, and treatment options, including any risks or benefits regarding use of medications. This information does not endorse any treatments or medications as safe, effective, or approved for treating a specific patient. UpToDate, Inc. and its affiliates disclaim any warranty or liability relating to this information or the use thereof.The use of this information is governed by the Terms of Use, available at https://www.[employer] Technologiesuwer.com/en/know/clinical-effectiveness-terms. 2024© UpToDate, Inc. and its affiliates and/or licensors. All rights reserved.  Copyright   © 2024 UpToDate, Inc. and/or its affiliates. All rights reserved.

## 2025-04-17 NOTE — PROGRESS NOTES
Name: Patito Driscoll      : 1955      MRN: [id number]  Encounter Provider: Raul Stone MD  Encounter Date: 2025   Encounter department: [facility] SLEEP [facility]IC[facility] [place]  :  Assessment & Plan  BLANCA (obstructive sleep apnea)  Mild BLANCA with OYDIT of 7.9 events per hour  Has been using CPAP with improvement in sleep quality and excessive daytime sleepiness averaging 7 to 8 hours of sleep  Review compliance  2020 2500% using the machine  Averaging 8 hours 40 minutes  Pressure 10-15  Nasal mask, no significant leak residual AHI of 0.3 events per hour  Orders:    PAP DME Resupply/Reorder    Class 2 severe obesity with serious comorbidity and body mass index (BMI) of 36.0 to 36.9 in adult, unspecified obesity type (HCC)  BMI 33.09  Likely contributing to the BLANCA, she would benefit from weight loss       Hypertension, essential, benign  Treatment of BLANCA might help control blood pressure           History of Present Illness      69-year-old with past medical history of hypertension, obesity, mild BLANCA has been doing well on CPAP no significant issues admittedly she has been sleeping 7 to 8 hours denies snoring with the CPAP benefiting improvement and continued use    [place] scale  Sitting and reading: Slight chance of dozing  Watching TV: Slight chance of dozing  Sitting, inactive in a public place (e.g. a theatre or a meeting): Would never doze  As a passenger in a car for an hour without a break: Would never doze  Lying down to rest in the afternoon when circumstances permit: Slight chance of dozing  Sitting and talking to someone: Would never doze  Sitting quietly after a lunch without alcohol: Would never doze  In a car, while stopped for a few minutes in traffic: Would never doze  Total score: 3       Pertinent positives/negatives included in HPI and also as noted:     Medical History Reviewed by provider this encounter:     .  Past Medical History   Past Medical History:   Diagnosis Date    BRCA1  negative     BRCA2 negative     Complex endometrial hyperplasia     Diabetes mellitus (HCC)     Diabetes mellitus due to underlying condition, uncontrolled, with hyperglycemia (HCC) 08/10/2020    Hypertension     Pneumonia      Past Surgical History:   Procedure Laterality Date    HYSTERECTOMY  03/2017    Dr Rose    TOTAL ABDOMINAL HYSTERECTOMY W/ BILATERAL SALPINGOOPHORECTOMY       Family History   Problem Relation Age of Onset    Breast cancer Mother 50    Hyperlipidemia Mother     Bone cancer Mother     Diabetes Father     Hypertension Father     Prostate cancer Father     Breast cancer Maternal Grandmother     No Known Problems Maternal Grandfather     No Known Problems Paternal Grandmother     Liver cancer Paternal Grandfather     No Known Problems Maternal Aunt     No Known Problems Maternal Aunt     No Known Problems Maternal Aunt     No Known Problems Paternal Aunt       reports that she has never smoked. She has never used smokeless tobacco. She reports current alcohol use. She reports that she does not use drugs.  Current Outpatient Medications   Medication Instructions    albuterol (PROVENTIL HFA,VENTOLIN HFA) 90 mcg/act inhaler 2 puffs, Every 4 hours PRN    Aspirin Buf,CaCarb-MgCarb-MgO, 81 MG TABS 1 tablet, Daily    atorvastatin (LIPITOR) 10 mg tablet TAKE 1 TABLET DAILY    Cholecalciferol 50 MCG (2000 UT) TABS Daily    citalopram (CELEXA) 10 mg, Oral, Daily    fluticasone (Flonase) 50 mcg/act nasal spray 2 sprays, Nasal, Daily    Krill Oil 1000 MG CAPS Daily    lisinopril (ZESTRIL) 10 mg tablet TAKE 1 TABLET DAILY    magnesium 200 mg, As needed    metFORMIN (GLUCOPHAGE) 1,000 mg, Oral, 2 times daily with meals    multivitamin (THERAGRAN) TABS 1 tablet, Daily    OneTouch Ultra test strip No dose, route, or frequency recorded.    semaglutide (1 mg/dose) (OZEMPIC (1 MG/DOSE)) 1 mg, Subcutaneous, Every 7 days    Turmeric (QC TUMERIC COMPLEX PO) 2,000 Int'l Units/L    vitamin B-12 (VITAMIN B-12) 500 mcg  tablet 1 tablet, Daily     Allergies   Allergen Reactions    Amoxicillin Nausea Only    Sulfamethoxazole-Trimethoprim      rash    Tramadol Nausea Only     Other reaction(s): Nausea and/or vomiting    Omeprazole Rash    Penicillins Itching and Rash     Reaction Date: 20Dec2011;   tolerates ceftriaxone        Current Outpatient Medications on File Prior to Visit   Medication Sig Dispense Refill    albuterol (PROVENTIL HFA,VENTOLIN HFA) 90 mcg/act inhaler Inhale 2 puffs every 4 (four) hours as needed      Aspirin Buf,CaCarb-MgCarb-MgO, 81 MG TABS Take 1 tablet by mouth daily      atorvastatin (LIPITOR) 10 mg tablet TAKE 1 TABLET DAILY 90 tablet 3    Cholecalciferol 50 MCG (2000 UT) TABS Take by mouth daily       citalopram (CeleXA) 10 mg tablet Take 1 tablet (10 mg total) by mouth daily 90 tablet 3    fluticasone (Flonase) 50 mcg/act nasal spray 2 sprays into each nostril daily 3 Bottle 2    Krill Oil 1000 MG CAPS Take by mouth in the morning      lisinopril (ZESTRIL) 10 mg tablet TAKE 1 TABLET DAILY 90 tablet 3    magnesium 30 MG tablet Take 200 mg by mouth if needed As needed      metFORMIN (GLUCOPHAGE) 500 mg tablet Take 2 tablets (1,000 mg total) by mouth 2 (two) times a day with meals 360 tablet 1    multivitamin (THERAGRAN) TABS Take 1 tablet by mouth daily      OneTouch Ultra test strip       semaglutide, 1 mg/dose, (Ozempic, 1 MG/DOSE,) 4 mg/3 mL injection pen Inject 0.75 mL (1 mg total) under the skin every 7 days 9 mL 1    Turmeric (QC TUMERIC COMPLEX PO) Take 2,000 Int'l Units/L by mouth      vitamin B-12 (VITAMIN B-12) 500 mcg tablet Take 1 tablet by mouth daily       No current facility-administered medications on file prior to visit.      Social History     Tobacco Use    Smoking status: Never    Smokeless tobacco: Never   Vaping Use    Vaping status: Never Used   Substance and Sexual Activity    Alcohol use: Yes     Comment: 2 a month    Drug use: Never    Sexual activity: Not on file     Objective   BP  "130/70   Ht 5' 6\" (1.676 m)   Wt 93 kg (205 lb)   BMI 33.09 kg/m²        Physical Exam    Data  Lab Results   Component Value Date    HGB 13.1 11/11/2024    HCT 41.1 11/11/2024    MCV 94 11/11/2024      Lab Results   Component Value Date    CALCIUM 9.1 11/11/2024    K 4.2 11/11/2024    CO2 28 11/11/2024    CL 98 11/11/2024    BUN 20 11/11/2024    CREATININE 0.94 11/11/2024     Lab Results   Component Value Date    FERRITIN 42 12/02/2019     Lab Results   Component Value Date    AST 16 11/11/2024    ALT 21 11/11/2024     Review compliance  4/9/2020 2500% using the machine  Averaging 8 hours 40 minutes  Pressure 10-15  Nasal mask, no significant leak residual AHI of 0.3 events per hour    Administrative Statements   I have spent a total time of 33 minutes in caring for this patient on the day of the visit/encounter including Impressions, Documenting in the medical record, and Reviewing/placing orders in the medical record (including tests, medications, and/or procedures).  "

## 2025-04-18 ENCOUNTER — TELEPHONE (OUTPATIENT)
Dept: SLEEP CENTER | Facility: CLINIC | Age: 70
End: 2025-04-18

## 2025-04-22 LAB

## 2025-04-23 ENCOUNTER — APPOINTMENT (OUTPATIENT)
Dept: LAB | Facility: CLINIC | Age: 70
End: 2025-04-23
Payer: COMMERCIAL

## 2025-04-23 DIAGNOSIS — E04.2 MULTIPLE THYROID NODULES: ICD-10-CM

## 2025-04-23 DIAGNOSIS — E11.65 TYPE 2 DIABETES MELLITUS WITH HYPERGLYCEMIA, WITHOUT LONG-TERM CURRENT USE OF INSULIN (HCC): ICD-10-CM

## 2025-04-23 DIAGNOSIS — Z00.6 ENCOUNTER FOR EXAMINATION FOR NORMAL COMPARISON OR CONTROL IN CLINICAL RESEARCH PROGRAM: ICD-10-CM

## 2025-04-23 LAB
ALBUMIN SERPL BCG-MCNC: 3.9 G/DL (ref 3.5–5)
ALP SERPL-CCNC: 90 U/L (ref 34–104)
ALT SERPL W P-5'-P-CCNC: 21 U/L (ref 7–52)
ANION GAP SERPL CALCULATED.3IONS-SCNC: 9 MMOL/L (ref 4–13)
AST SERPL W P-5'-P-CCNC: 17 U/L (ref 13–39)
BASOPHILS # BLD AUTO: 0.04 THOUSANDS/ÂΜL (ref 0–0.1)
BASOPHILS NFR BLD AUTO: 1 % (ref 0–1)
BILIRUB SERPL-MCNC: 0.55 MG/DL (ref 0.2–1)
BUN SERPL-MCNC: 15 MG/DL (ref 5–25)
CALCIUM SERPL-MCNC: 9.1 MG/DL (ref 8.4–10.2)
CHLORIDE SERPL-SCNC: 100 MMOL/L (ref 96–108)
CHOLEST SERPL-MCNC: 156 MG/DL (ref ?–200)
CO2 SERPL-SCNC: 30 MMOL/L (ref 21–32)
CREAT SERPL-MCNC: 0.85 MG/DL (ref 0.6–1.3)
CREAT UR-MCNC: 108 MG/DL
EOSINOPHIL # BLD AUTO: 0.19 THOUSAND/ÂΜL (ref 0–0.61)
EOSINOPHIL NFR BLD AUTO: 3 % (ref 0–6)
ERYTHROCYTE [DISTWIDTH] IN BLOOD BY AUTOMATED COUNT: 12.2 % (ref 11.6–15.1)
GFR SERPL CREATININE-BSD FRML MDRD: 70 ML/MIN/1.73SQ M
GLUCOSE P FAST SERPL-MCNC: 219 MG/DL (ref 65–99)
HCT VFR BLD AUTO: 42.1 % (ref 34.8–46.1)
HDLC SERPL-MCNC: 51 MG/DL
HGB BLD-MCNC: 13.1 G/DL (ref 11.5–15.4)
IMM GRANULOCYTES # BLD AUTO: 0.04 THOUSAND/UL (ref 0–0.2)
IMM GRANULOCYTES NFR BLD AUTO: 1 % (ref 0–2)
LDLC SERPL CALC-MCNC: 81 MG/DL (ref 0–100)
LYMPHOCYTES # BLD AUTO: 1.95 THOUSANDS/ÂΜL (ref 0.6–4.47)
LYMPHOCYTES NFR BLD AUTO: 27 % (ref 14–44)
MCH RBC QN AUTO: 29.1 PG (ref 26.8–34.3)
MCHC RBC AUTO-ENTMCNC: 31.1 G/DL (ref 31.4–37.4)
MCV RBC AUTO: 94 FL (ref 82–98)
MICROALBUMIN UR-MCNC: 19.8 MG/L
MICROALBUMIN/CREAT 24H UR: 18 MG/G CREATININE (ref 0–30)
MONOCYTES # BLD AUTO: 0.3 THOUSAND/ÂΜL (ref 0.17–1.22)
MONOCYTES NFR BLD AUTO: 4 % (ref 4–12)
NEUTROPHILS # BLD AUTO: 4.7 THOUSANDS/ÂΜL (ref 1.85–7.62)
NEUTS SEG NFR BLD AUTO: 64 % (ref 43–75)
NRBC BLD AUTO-RTO: 0 /100 WBCS
PLATELET # BLD AUTO: 278 THOUSANDS/UL (ref 149–390)
PMV BLD AUTO: 10.3 FL (ref 8.9–12.7)
POTASSIUM SERPL-SCNC: 4 MMOL/L (ref 3.5–5.3)
PROT SERPL-MCNC: 6.7 G/DL (ref 6.4–8.4)
RBC # BLD AUTO: 4.5 MILLION/UL (ref 3.81–5.12)
SODIUM SERPL-SCNC: 139 MMOL/L (ref 135–147)
T3 SERPL-MCNC: 0.9 NG/ML (ref 0.9–1.8)
T4 FREE SERPL-MCNC: 0.92 NG/DL (ref 0.61–1.12)
TRIGL SERPL-MCNC: 121 MG/DL (ref ?–150)
TSH SERPL DL<=0.05 MIU/L-ACNC: 2.5 UIU/ML (ref 0.45–4.5)
WBC # BLD AUTO: 7.22 THOUSAND/UL (ref 4.31–10.16)

## 2025-04-23 PROCEDURE — 85025 COMPLETE CBC W/AUTO DIFF WBC: CPT

## 2025-04-23 PROCEDURE — 84439 ASSAY OF FREE THYROXINE: CPT

## 2025-04-23 PROCEDURE — 86800 THYROGLOBULIN ANTIBODY: CPT

## 2025-04-23 PROCEDURE — 86376 MICROSOMAL ANTIBODY EACH: CPT

## 2025-04-23 PROCEDURE — 82570 ASSAY OF URINE CREATININE: CPT

## 2025-04-23 PROCEDURE — 82043 UR ALBUMIN QUANTITATIVE: CPT

## 2025-04-23 PROCEDURE — 84443 ASSAY THYROID STIM HORMONE: CPT

## 2025-04-23 PROCEDURE — 80061 LIPID PANEL: CPT

## 2025-04-23 PROCEDURE — 80053 COMPREHEN METABOLIC PANEL: CPT

## 2025-04-23 PROCEDURE — 36415 COLL VENOUS BLD VENIPUNCTURE: CPT

## 2025-04-23 PROCEDURE — 84480 ASSAY TRIIODOTHYRONINE (T3): CPT

## 2025-04-24 LAB
THYROGLOB AB SERPL-ACNC: <1 IU/ML (ref 0–0.9)
THYROPEROXIDASE AB SERPL-ACNC: 10 IU/ML (ref 0–34)

## 2025-04-28 ENCOUNTER — RESULTS FOLLOW-UP (OUTPATIENT)
Dept: ENDOCRINOLOGY | Facility: CLINIC | Age: 70
End: 2025-04-28

## 2025-04-28 DIAGNOSIS — E11.65 TYPE 2 DIABETES MELLITUS WITH HYPERGLYCEMIA, WITHOUT LONG-TERM CURRENT USE OF INSULIN (HCC): Primary | ICD-10-CM

## 2025-04-28 RX ORDER — GLIPIZIDE 5 MG/1
5 TABLET ORAL
Qty: 180 TABLET | Refills: 0 | Status: SHIPPED | OUTPATIENT
Start: 2025-04-28 | End: 2025-05-19

## 2025-05-02 LAB
APOB+LDLR+PCSK9 GENE MUT ANL BLD/T: NOT DETECTED
BRCA1+BRCA2 DEL+DUP + FULL MUT ANL BLD/T: NOT DETECTED
MLH1+MSH2+MSH6+PMS2 GN DEL+DUP+FUL M: NOT DETECTED

## 2025-05-05 LAB

## 2025-05-19 ENCOUNTER — OFFICE VISIT (OUTPATIENT)
Dept: INTERNAL MEDICINE CLINIC | Facility: CLINIC | Age: 70
End: 2025-05-19
Payer: COMMERCIAL

## 2025-05-19 VITALS
HEART RATE: 89 BPM | DIASTOLIC BLOOD PRESSURE: 70 MMHG | OXYGEN SATURATION: 99 % | BODY MASS INDEX: 33.25 KG/M2 | SYSTOLIC BLOOD PRESSURE: 120 MMHG | WEIGHT: 206 LBS

## 2025-05-19 DIAGNOSIS — E11.65 TYPE 2 DIABETES MELLITUS WITH HYPERGLYCEMIA, WITHOUT LONG-TERM CURRENT USE OF INSULIN (HCC): Primary | ICD-10-CM

## 2025-05-19 DIAGNOSIS — E66.812 CLASS 2 SEVERE OBESITY WITH SERIOUS COMORBIDITY AND BODY MASS INDEX (BMI) OF 36.0 TO 36.9 IN ADULT, UNSPECIFIED OBESITY TYPE (HCC): ICD-10-CM

## 2025-05-19 DIAGNOSIS — E66.01 CLASS 2 SEVERE OBESITY WITH SERIOUS COMORBIDITY AND BODY MASS INDEX (BMI) OF 36.0 TO 36.9 IN ADULT, UNSPECIFIED OBESITY TYPE (HCC): ICD-10-CM

## 2025-05-19 DIAGNOSIS — I10 HYPERTENSION, ESSENTIAL, BENIGN: ICD-10-CM

## 2025-05-19 PROCEDURE — G2211 COMPLEX E/M VISIT ADD ON: HCPCS | Performed by: INTERNAL MEDICINE

## 2025-05-19 PROCEDURE — 99214 OFFICE O/P EST MOD 30 MIN: CPT | Performed by: INTERNAL MEDICINE

## 2025-05-19 NOTE — ASSESSMENT & PLAN NOTE
She was not on Ozempic for about 2 months prior to the blood work  She has 1 more month of 1 mg and will increase to 2 mg  Continue metformin 2 g a day.  To glipizide so did not resume.  Follow-up with endocrinology as scheduled in September  She is on a statin.  She takes atorvastatin only once a week.  Her cholesterol was very low even before the statin    Lab Results   Component Value Date    HGBA1C 11.3 (H) 04/23/2025       Orders:  •  semaglutide, 2 mg/dose, (Ozempic) 8 mg/ mL injection pen; Inject 0.75 mL (2 mg total) under the skin every 7 days  •  Hemoglobin A1C; Future  •  Comprehensive metabolic panel; Future  •  Lipid Panel with Direct LDL reflex; Future

## 2025-05-19 NOTE — PROGRESS NOTES
Name: Patito Driscoll      : 1955      MRN: [id number]  Encounter Provider: Lea Reyes, MD  Encounter Date: 2025   Encounter department: [facility]    Assessment & Plan  Type 2 diabetes mellitus with hyperglycemia, without long-term current use of insulin (HCC)  She was not on Ozempic for about 2 months prior to the blood work  She has 1 more month of 1 mg and will increase to 2 mg  Continue metformin 2 g a day.  To glipizide so did not resume.  Follow-up with endocrinology as scheduled in September  She is on a statin.  She takes atorvastatin only once a week.  Her cholesterol was very low even before the statin    Lab Results   Component Value Date    HGBA1C 11.3 (H) 2025       Orders:  •  semaglutide, 2 mg/dose, (Ozempic) 8 mg/ mL injection pen; Inject 0.75 mL (2 mg total) under the skin every 7 days  •  Hemoglobin A1C; Future  •  Comprehensive metabolic panel; Future  •  Lipid Panel with Direct LDL reflex; Future    Class 2 severe obesity with serious comorbidity and body mass index (BMI) of 36.0 to 36.9 in adult, unspecified obesity type (HCC)  She is losing weight between the Ozempic and lifestyle changes  Increased exercise       Hypertension, essential, benign  Controlled            History of Present Illness     Here for a follow-up  Diabetes with a hemoglobin A1c in April of 11.3.  Endocrine recommended resuming glipizide.  She did not receive this message and says she could not resume it anyway because she is allergic to it.  She had a rash from it.  She was not on Ozempic for 2 months until the end of March  She has been back on it for 2 months at 1 mg.She is also taking metformin.  Her sugars have improved but still very high in the 200s from 300s  Following my fitness pal keenan to track her caloric intake      Review of Systems   Constitutional:  Negative for unexpected weight change.   Respiratory:  Negative for shortness of breath.    Cardiovascular:  Negative  for chest pain and palpitations.   Gastrointestinal:  Negative for abdominal pain, constipation and diarrhea.   Endocrine: Positive for polydipsia. Negative for polyuria.   Neurological:  Negative for dizziness and headaches.     Past Medical History:   Diagnosis Date   • BRCA1 negative    • BRCA2 negative    • Complex endometrial hyperplasia    • Diabetes mellitus (HCC)    • Diabetes mellitus due to underlying condition, uncontrolled, with hyperglycemia (HCC) 08/10/2020   • Hypertension    • Pneumonia      Past Surgical History:   Procedure Laterality Date   • HYSTERECTOMY  03/2017    Dr Rose   • TOTAL ABDOMINAL HYSTERECTOMY W/ BILATERAL SALPINGOOPHORECTOMY       Family History   Problem Relation Age of Onset   • Breast cancer Mother 50   • Hyperlipidemia Mother    • Bone cancer Mother    • Diabetes Father    • Hypertension Father    • Prostate cancer Father    • Breast cancer Maternal Grandmother    • No Known Problems Maternal Grandfather    • No Known Problems Paternal Grandmother    • Liver cancer Paternal Grandfather    • No Known Problems Maternal Aunt    • No Known Problems Maternal Aunt    • No Known Problems Maternal Aunt    • No Known Problems Paternal Aunt      Social History     Tobacco Use   • Smoking status: Never   • Smokeless tobacco: Never   Vaping Use   • Vaping status: Never Used   Substance and Sexual Activity   • Alcohol use: Yes     Comment: 2 a month   • Drug use: Never   • Sexual activity: Not on file     Medications[1]  Allergies   Allergen Reactions   • Amoxicillin Nausea Only   • Sulfamethoxazole-Trimethoprim      rash   • Tramadol Nausea Only     Other reaction(s): Nausea and/or vomiting   • Glipizide Rash   • Omeprazole Rash   • Penicillins Itching and Rash     Reaction Date: 20Dec2011;   tolerates ceftriaxone       Immunization History   Administered Date(s) Administered   • COVID-19 MODERNA VACC 0.5 ML IM 04/30/2021, 05/28/2021   • COVID-19 PFIZER VACCINE 0.3 ML IM 01/27/2022   •  INFLUENZA 02/17/2014, 10/27/2014, 09/21/2015, 01/30/2017, 12/10/2018, 01/20/2020, 10/15/2021, 10/28/2022   • Influenza Split High Dose Preservative Free IM 11/14/2024   • Influenza, high dose seasonal 0.7 mL 10/04/2021, 10/28/2022, 11/13/2023   • Influenza, seasonal, injectable 11/30/2012   • Pneumococcal Conjugate 13-Valent 10/04/2021   • Pneumococcal Conjugate Vaccine 20-valent (Pcv20), Polysace 05/01/2023   • Pneumococcal Polysaccharide PPV23 07/14/2013, 07/16/2013   • Td (adult), Unspecified 06/22/2014   • Tdap 09/26/2016   • Zoster Vaccine Recombinant 07/01/2019, 02/06/2020     Objective   /70 (BP Location: Left arm, Patient Position: Sitting, Cuff Size: Standard)   Pulse 89   Wt 93.4 kg (206 lb)   SpO2 99%   BMI 33.25 kg/m²     Physical Exam  Constitutional:       General: She is not in acute distress.     Appearance: She is well-developed. She is not ill-appearing, toxic-appearing or diaphoretic.     Eyes:      Conjunctiva/sclera: Conjunctivae normal.       Cardiovascular:      Rate and Rhythm: Normal rate and regular rhythm.      Heart sounds: Normal heart sounds. No murmur heard.  Pulmonary:      Effort: Pulmonary effort is normal. No respiratory distress.      Breath sounds: Normal breath sounds. No stridor. No wheezing or rales.   Abdominal:      General: There is no distension.      Palpations: Abdomen is soft. There is no mass.      Tenderness: There is no abdominal tenderness. There is no guarding or rebound.     Musculoskeletal:      Cervical back: Neck supple.      Right lower leg: No edema.      Left lower leg: No edema.     Neurological:      Mental Status: She is alert and oriented to person, place, and time.     Psychiatric:         Mood and Affect: Mood normal.         Behavior: Behavior normal.         Thought Content: Thought content normal.         Judgment: Judgment normal.                [1]  Current Outpatient Medications on File Prior to Visit   Medication Sig   • Aspirin  Buf,CaCarb-MgCarb-MgO, 81 MG TABS Take 1 tablet by mouth in the morning.   • atorvastatin (LIPITOR) 10 mg tablet TAKE 1 TABLET DAILY   • Cholecalciferol 50 MCG (2000 UT) TABS Take by mouth in the morning.   • citalopram (CeleXA) 10 mg tablet Take 1 tablet (10 mg total) by mouth daily   • fluticasone (Flonase) 50 mcg/act nasal spray 2 sprays into each nostril daily   • Krill Oil 1000 MG CAPS Take by mouth in the morning   • lisinopril (ZESTRIL) 10 mg tablet TAKE 1 TABLET DAILY   • magnesium 30 MG tablet Take 200 mg by mouth if needed As needed   • metFORMIN (GLUCOPHAGE) 500 mg tablet Take 2 tablets (1,000 mg total) by mouth 2 (two) times a day with meals   • multivitamin (THERAGRAN) TABS Take 1 tablet by mouth in the morning.   • OneTouch Ultra test strip    • Turmeric (QC TUMERIC COMPLEX PO) Take 2,000 Int'l Units/L by mouth   • vitamin B-12 (VITAMIN B-12) 500 mcg tablet Take 1 tablet by mouth in the morning.   • VITAMIN D PO Take by mouth   • [DISCONTINUED] semaglutide, 1 mg/dose, (Ozempic, 1 MG/DOSE,) 4 mg/3 mL injection pen Inject 0.75 mL (1 mg total) under the skin every 7 days   • albuterol (PROVENTIL HFA,VENTOLIN HFA) 90 mcg/act inhaler Inhale 2 puffs every 4 (four) hours as needed (Patient not taking: Reported on 5/19/2025)   • [DISCONTINUED] glipiZIDE (GLUCOTROL) 5 mg tablet Take 1 tablet (5 mg total) by mouth 2 (two) times a day before meals (Patient not taking: Reported on 5/19/2025)    none

## 2025-05-29 DIAGNOSIS — R73.09 HEMOGLOBIN A1C GREATER THAN 9%, INDICATING POOR DIABETIC CONTROL: Primary | ICD-10-CM

## 2025-05-30 ENCOUNTER — PATIENT OUTREACH (OUTPATIENT)
Dept: CASE MANAGEMENT | Facility: OTHER | Age: 70
End: 2025-05-30

## 2025-05-30 NOTE — PROGRESS NOTES
Outpatient Care Management note- CM referral, DM     Chart review completed    Patient has a h/o HTN, Raynaud's BLANCA, MDD, obesity, HLD, DM2    Patient is DM type 2; most recent A1c 11.3% on 4/23/25. Prior was 8.5% on 11/11/24. Most recent PCP visit on 5/19/25. Patient reported she stopped her Ozempic 2 months prior to having labs drawn. She was advised to continue on Ozempic 1 mg for 1 more month and then increase to 2 mg. She is to continue Metformin 1000 mg BID.     Patito follows with Endo. 4/28/25 chart notes result update from the provider and advised to restart her Glipizide 5 mg BID. Unsure if patient is taking Glipizide at this time. Next Endo appt 9/8/25.    Call initiated to the patient and left voicemail for a return call. A follow up [employer] message was sent as well as per the DM  protocol.     Await a call back from the patient.

## 2025-06-06 ENCOUNTER — PATIENT OUTREACH (OUTPATIENT)
Dept: CASE MANAGEMENT | Facility: OTHER | Age: 70
End: 2025-06-06

## 2025-06-06 NOTE — PROGRESS NOTES
Outpatient Care Management note- follow up call, DM     Chart review completed    Outreach to the patient on 5/30/25 with no response to voicemail message. A follow up Mychart message was also sent with no response. 2nd call placed today, left voicemail again. Will follow up with an unable to reach letter via [employer].     Await a call back from the patient.

## 2025-06-06 NOTE — LETTER
Date: 06/06/25    Dear Patito Driscoll,   My name is Gena; I am a registered nurse care manager working with [facility] within [facility].   I have not been able to reach you and would like to set a time that I can talk with you over the phone.  My work is to help patients that have complex medical conditions get the care they need. This includes patients who may have been in the hospital or emergency room.    Please call me with any questions you may have. I look forward to speaking with you.    Sincerely,  Gena WALSH, RN  [phone]  Outpatient Registered Nurse Care Manager

## 2025-06-13 DIAGNOSIS — I10 HYPERTENSION, ESSENTIAL, BENIGN: ICD-10-CM

## 2025-06-13 RX ORDER — LISINOPRIL 10 MG/1
10 TABLET ORAL DAILY
Qty: 90 TABLET | Refills: 1 | Status: SHIPPED | OUTPATIENT
Start: 2025-06-13

## 2025-06-20 ENCOUNTER — PATIENT OUTREACH (OUTPATIENT)
Dept: CASE MANAGEMENT | Facility: OTHER | Age: 70
End: 2025-06-20

## 2025-06-20 NOTE — PROGRESS NOTES
Outpatient Care Management note- follow up call    Chart review completed    Left voicemail for patient to return call. Will initiate monthly leia review unless the patient makes contact.     Active lab orders in chart. Endo appt 9/8/25.    Await a call back from the patient

## 2025-07-03 ENCOUNTER — PATIENT OUTREACH (OUTPATIENT)
Dept: CASE MANAGEMENT | Facility: OTHER | Age: 70
End: 2025-07-03

## 2025-07-03 NOTE — PROGRESS NOTES
Outpatient Care Management note- DM , initiation of monthly chart review    Surveillance 5/29/25 to 8/29/25    Chart review completed    No utilization of services since last outreach attempt. Patient is scheduled with endo on 9/8/25. She has active lab orders placed.     Next chart review scheduled.

## 2025-07-21 ENCOUNTER — TELEPHONE (OUTPATIENT)
Dept: OTHER | Facility: HOSPITAL | Age: 70
End: 2025-07-21

## 2025-07-21 NOTE — TELEPHONE ENCOUNTER
Patient called requesting refill for metformin 500mg. Patient made aware medication was refilled on 3/17/25 for 360 with 1 refills to [facility]  pharmacy. Patient instructed to contact the pharmacy and speak with someone directly to obtain refills of medication. Patient advised to call back for refill if their pharmacy is unable to assist them. Patient verbalized understanding.     She noted [facility] but since she is out currently, she will  at [facility] and notify us when she is [facility].

## 2025-08-19 ENCOUNTER — PATIENT OUTREACH (OUTPATIENT)
Dept: CASE MANAGEMENT | Facility: OTHER | Age: 70
End: 2025-08-19